# Patient Record
Sex: FEMALE | Race: BLACK OR AFRICAN AMERICAN | NOT HISPANIC OR LATINO | URBAN - METROPOLITAN AREA
[De-identification: names, ages, dates, MRNs, and addresses within clinical notes are randomized per-mention and may not be internally consistent; named-entity substitution may affect disease eponyms.]

---

## 2017-04-26 ENCOUNTER — EMERGENCY (EMERGENCY)
Facility: HOSPITAL | Age: 37
LOS: 1 days | Discharge: HOME | End: 2017-04-26
Attending: EMERGENCY MEDICINE | Admitting: EMERGENCY MEDICINE
Payer: COMMERCIAL

## 2017-04-26 VITALS
TEMPERATURE: 98 F | SYSTOLIC BLOOD PRESSURE: 142 MMHG | DIASTOLIC BLOOD PRESSURE: 95 MMHG | RESPIRATION RATE: 16 BRPM | OXYGEN SATURATION: 100 % | HEART RATE: 74 BPM

## 2017-04-26 PROCEDURE — 99285 EMERGENCY DEPT VISIT HI MDM: CPT

## 2017-04-27 VITALS
DIASTOLIC BLOOD PRESSURE: 72 MMHG | RESPIRATION RATE: 16 BRPM | HEART RATE: 74 BPM | OXYGEN SATURATION: 100 % | SYSTOLIC BLOOD PRESSURE: 107 MMHG

## 2017-04-27 LAB
ALBUMIN SERPL ELPH-MCNC: 4.1 G/DL — SIGNIFICANT CHANGE UP (ref 3.3–5)
ALP SERPL-CCNC: 68 U/L — SIGNIFICANT CHANGE UP (ref 40–120)
ALT FLD-CCNC: 18 U/L — SIGNIFICANT CHANGE UP (ref 4–33)
APPEARANCE UR: CLEAR — SIGNIFICANT CHANGE UP
AST SERPL-CCNC: 22 U/L — SIGNIFICANT CHANGE UP (ref 4–32)
BACTERIA # UR AUTO: SIGNIFICANT CHANGE UP
BASOPHILS # BLD AUTO: 0.03 K/UL — SIGNIFICANT CHANGE UP (ref 0–0.2)
BASOPHILS NFR BLD AUTO: 0.4 % — SIGNIFICANT CHANGE UP (ref 0–2)
BILIRUB SERPL-MCNC: 0.3 MG/DL — SIGNIFICANT CHANGE UP (ref 0.2–1.2)
BILIRUB UR-MCNC: NEGATIVE — SIGNIFICANT CHANGE UP
BLD GP AB SCN SERPL QL: NEGATIVE — SIGNIFICANT CHANGE UP
BLOOD UR QL VISUAL: HIGH
BUN SERPL-MCNC: 9 MG/DL — SIGNIFICANT CHANGE UP (ref 7–23)
CALCIUM SERPL-MCNC: 8.9 MG/DL — SIGNIFICANT CHANGE UP (ref 8.4–10.5)
CHLORIDE SERPL-SCNC: 105 MMOL/L — SIGNIFICANT CHANGE UP (ref 98–107)
CO2 SERPL-SCNC: 20 MMOL/L — LOW (ref 22–31)
COLOR SPEC: YELLOW — SIGNIFICANT CHANGE UP
CREAT SERPL-MCNC: 0.71 MG/DL — SIGNIFICANT CHANGE UP (ref 0.5–1.3)
EOSINOPHIL # BLD AUTO: 0.23 K/UL — SIGNIFICANT CHANGE UP (ref 0–0.5)
EOSINOPHIL NFR BLD AUTO: 2.8 % — SIGNIFICANT CHANGE UP (ref 0–6)
GLUCOSE SERPL-MCNC: 78 MG/DL — SIGNIFICANT CHANGE UP (ref 70–99)
GLUCOSE UR-MCNC: NEGATIVE — SIGNIFICANT CHANGE UP
HCG SERPL-ACNC: 80.45 MIU/ML — SIGNIFICANT CHANGE UP
HCT VFR BLD CALC: 40.3 % — SIGNIFICANT CHANGE UP (ref 34.5–45)
HGB BLD-MCNC: 12.8 G/DL — SIGNIFICANT CHANGE UP (ref 11.5–15.5)
HYALINE CASTS # UR AUTO: SIGNIFICANT CHANGE UP (ref 0–?)
IMM GRANULOCYTES NFR BLD AUTO: 0.4 % — SIGNIFICANT CHANGE UP (ref 0–1.5)
KETONES UR-MCNC: NEGATIVE — SIGNIFICANT CHANGE UP
LEUKOCYTE ESTERASE UR-ACNC: SIGNIFICANT CHANGE UP
LYMPHOCYTES # BLD AUTO: 2.89 K/UL — SIGNIFICANT CHANGE UP (ref 1–3.3)
LYMPHOCYTES # BLD AUTO: 34.9 % — SIGNIFICANT CHANGE UP (ref 13–44)
MCHC RBC-ENTMCNC: 28 PG — SIGNIFICANT CHANGE UP (ref 27–34)
MCHC RBC-ENTMCNC: 31.8 % — LOW (ref 32–36)
MCV RBC AUTO: 88.2 FL — SIGNIFICANT CHANGE UP (ref 80–100)
MONOCYTES # BLD AUTO: 0.62 K/UL — SIGNIFICANT CHANGE UP (ref 0–0.9)
MONOCYTES NFR BLD AUTO: 7.5 % — SIGNIFICANT CHANGE UP (ref 2–14)
MUCOUS THREADS # UR AUTO: SIGNIFICANT CHANGE UP
NEUTROPHILS # BLD AUTO: 4.49 K/UL — SIGNIFICANT CHANGE UP (ref 1.8–7.4)
NEUTROPHILS NFR BLD AUTO: 54 % — SIGNIFICANT CHANGE UP (ref 43–77)
NITRITE UR-MCNC: NEGATIVE — SIGNIFICANT CHANGE UP
PH UR: 6 — SIGNIFICANT CHANGE UP (ref 4.6–8)
PLATELET # BLD AUTO: 294 K/UL — SIGNIFICANT CHANGE UP (ref 150–400)
PMV BLD: 10.7 FL — SIGNIFICANT CHANGE UP (ref 7–13)
POTASSIUM SERPL-MCNC: 3.7 MMOL/L — SIGNIFICANT CHANGE UP (ref 3.5–5.3)
POTASSIUM SERPL-SCNC: 3.7 MMOL/L — SIGNIFICANT CHANGE UP (ref 3.5–5.3)
PROT SERPL-MCNC: 6.9 G/DL — SIGNIFICANT CHANGE UP (ref 6–8.3)
PROT UR-MCNC: 20 — SIGNIFICANT CHANGE UP
RBC # BLD: 4.57 M/UL — SIGNIFICANT CHANGE UP (ref 3.8–5.2)
RBC # FLD: 15.4 % — HIGH (ref 10.3–14.5)
RBC CASTS # UR COMP ASSIST: HIGH (ref 0–?)
RH IG SCN BLD-IMP: POSITIVE — SIGNIFICANT CHANGE UP
SODIUM SERPL-SCNC: 140 MMOL/L — SIGNIFICANT CHANGE UP (ref 135–145)
SP GR SPEC: 1.03 — SIGNIFICANT CHANGE UP (ref 1–1.03)
SQUAMOUS # UR AUTO: SIGNIFICANT CHANGE UP
TSH SERPL-MCNC: 2.05 UIU/ML — SIGNIFICANT CHANGE UP (ref 0.27–4.2)
UROBILINOGEN FLD QL: NORMAL E.U. — SIGNIFICANT CHANGE UP (ref 0.1–0.2)
WBC # BLD: 8.29 K/UL — SIGNIFICANT CHANGE UP (ref 3.8–10.5)
WBC # FLD AUTO: 8.29 K/UL — SIGNIFICANT CHANGE UP (ref 3.8–10.5)
WBC UR QL: SIGNIFICANT CHANGE UP (ref 0–?)

## 2017-04-27 PROCEDURE — 99283 EMERGENCY DEPT VISIT LOW MDM: CPT

## 2017-04-27 PROCEDURE — 76830 TRANSVAGINAL US NON-OB: CPT | Mod: 26

## 2017-04-27 RX ORDER — ONDANSETRON 8 MG/1
4 TABLET, FILM COATED ORAL ONCE
Qty: 0 | Refills: 0 | Status: COMPLETED | OUTPATIENT
Start: 2017-04-27 | End: 2017-04-27

## 2017-04-27 RX ORDER — SODIUM CHLORIDE 9 MG/ML
1000 INJECTION INTRAMUSCULAR; INTRAVENOUS; SUBCUTANEOUS ONCE
Qty: 0 | Refills: 0 | Status: COMPLETED | OUTPATIENT
Start: 2017-04-27 | End: 2017-04-27

## 2017-04-27 RX ADMIN — ONDANSETRON 4 MILLIGRAM(S): 8 TABLET, FILM COATED ORAL at 01:24

## 2017-04-27 RX ADMIN — SODIUM CHLORIDE 1000 MILLILITER(S): 9 INJECTION INTRAMUSCULAR; INTRAVENOUS; SUBCUTANEOUS at 01:24

## 2017-04-27 NOTE — ED PROVIDER NOTE - ATTENDING CONTRIBUTION TO CARE
Pt was seen and evaluated by me. Pt states having LLQ abd pain X 1 day. Pt states her LMP was 5 wks ago and noted having some vaginal bleeding as well as 7 episodes of diarrhea, non-bloody over the past day. Pt denies any fever, chills, SOB, chest pain, or dysuria. Lungs CTA b/l. RRR. Abd soft, non-tender.

## 2017-04-27 NOTE — ED ADULT NURSE NOTE - OBJECTIVE STATEMENT
pt a&o x3 c/o b/l lower quadrant pain since today. pt c/o nausea and diarrhea, no vomitting. reports urinary frequency with little output, denies burning or pain upon urination. vs as documented. labs sent as ordered. nad noted. will monitor

## 2017-04-27 NOTE — ED PROVIDER NOTE - MEDICAL DECISION MAKING DETAILS
35 y/o female with LLQ abd pain X 1 day with vaginal bleeding and diarrhea. Concern for enteritis, UTI, r/o torsion.

## 2017-04-27 NOTE — ED PROVIDER NOTE - OBJECTIVE STATEMENT
36yF  p/w llq pain and vag bleeding since this am, lmp 5-6wks ago (pt typically has irregular periods), has had diarrhea and nausea since last pm. No bloody or black stools. No vomiting. Pain is sharp, nonradiating. No dysuria, recent travel, fever, or known sick contacts. Sexually active with 1 partner, does not use contraception. No STI hx. 36yF  p/w llq pain and vag bleeding since this am, lmp 5-6wks ago (pt typically has irregular periods), has had diarrhea and nausea since last pm. No bloody or black stools. No vomiting. Pain is sharp, non-radiating. No dysuria, recent travel, fever, or known sick contacts. Sexually active with 1 partner, does not use contraception. No STI hx.

## 2017-04-27 NOTE — ED PROVIDER NOTE - CARE PLAN
Principal Discharge DX:	Vaginal bleeding in pregnancy, first trimester  Instructions for follow-up, activity and diet:	The patient was given verbal and written discharge instructions. Specifically, instructions when to return to the ED and when to seek follow-up from their pcp was discussed. Any specialty follow-up was discussed, including how to make an appointment.  Instructions were discussed in simple, plain language and was understood by the patient. The patient understands that should their symptoms worsen or any new symptoms arise, they should return to the ED immediately for further evaluation.

## 2017-04-29 ENCOUNTER — EMERGENCY (EMERGENCY)
Facility: HOSPITAL | Age: 37
LOS: 1 days | Discharge: HOME | End: 2017-04-29
Attending: EMERGENCY MEDICINE | Admitting: EMERGENCY MEDICINE
Payer: COMMERCIAL

## 2017-04-29 VITALS
TEMPERATURE: 98 F | HEIGHT: 62 IN | HEART RATE: 85 BPM | WEIGHT: 149.91 LBS | RESPIRATION RATE: 16 BRPM | OXYGEN SATURATION: 100 % | DIASTOLIC BLOOD PRESSURE: 99 MMHG | SYSTOLIC BLOOD PRESSURE: 143 MMHG

## 2017-04-29 VITALS
TEMPERATURE: 98 F | OXYGEN SATURATION: 99 % | SYSTOLIC BLOOD PRESSURE: 140 MMHG | RESPIRATION RATE: 18 BRPM | HEART RATE: 88 BPM | DIASTOLIC BLOOD PRESSURE: 89 MMHG

## 2017-04-29 LAB
ALBUMIN SERPL ELPH-MCNC: 4.6 G/DL — SIGNIFICANT CHANGE UP (ref 3.3–5)
ALP SERPL-CCNC: 82 U/L — SIGNIFICANT CHANGE UP (ref 40–120)
ALT FLD-CCNC: 15 U/L — SIGNIFICANT CHANGE UP (ref 4–33)
AST SERPL-CCNC: 19 U/L — SIGNIFICANT CHANGE UP (ref 4–32)
BASOPHILS # BLD AUTO: 0.03 K/UL — SIGNIFICANT CHANGE UP (ref 0–0.2)
BASOPHILS NFR BLD AUTO: 0.3 % — SIGNIFICANT CHANGE UP (ref 0–2)
BILIRUB SERPL-MCNC: 0.2 MG/DL — SIGNIFICANT CHANGE UP (ref 0.2–1.2)
BUN SERPL-MCNC: 13 MG/DL — SIGNIFICANT CHANGE UP (ref 7–23)
CALCIUM SERPL-MCNC: 9.2 MG/DL — SIGNIFICANT CHANGE UP (ref 8.4–10.5)
CHLORIDE SERPL-SCNC: 102 MMOL/L — SIGNIFICANT CHANGE UP (ref 98–107)
CO2 SERPL-SCNC: 23 MMOL/L — SIGNIFICANT CHANGE UP (ref 22–31)
CREAT SERPL-MCNC: 0.76 MG/DL — SIGNIFICANT CHANGE UP (ref 0.5–1.3)
EOSINOPHIL # BLD AUTO: 0.31 K/UL — SIGNIFICANT CHANGE UP (ref 0–0.5)
EOSINOPHIL NFR BLD AUTO: 2.8 % — SIGNIFICANT CHANGE UP (ref 0–6)
GLUCOSE SERPL-MCNC: 93 MG/DL — SIGNIFICANT CHANGE UP (ref 70–99)
HCT VFR BLD CALC: 42.6 % — SIGNIFICANT CHANGE UP (ref 34.5–45)
HGB BLD-MCNC: 13.8 G/DL — SIGNIFICANT CHANGE UP (ref 11.5–15.5)
IMM GRANULOCYTES NFR BLD AUTO: 0.2 % — SIGNIFICANT CHANGE UP (ref 0–1.5)
LYMPHOCYTES # BLD AUTO: 3.32 K/UL — HIGH (ref 1–3.3)
LYMPHOCYTES # BLD AUTO: 30.4 % — SIGNIFICANT CHANGE UP (ref 13–44)
MCHC RBC-ENTMCNC: 28.9 PG — SIGNIFICANT CHANGE UP (ref 27–34)
MCHC RBC-ENTMCNC: 32.4 % — SIGNIFICANT CHANGE UP (ref 32–36)
MCV RBC AUTO: 89.1 FL — SIGNIFICANT CHANGE UP (ref 80–100)
MONOCYTES # BLD AUTO: 0.83 K/UL — SIGNIFICANT CHANGE UP (ref 0–0.9)
MONOCYTES NFR BLD AUTO: 7.6 % — SIGNIFICANT CHANGE UP (ref 2–14)
NEUTROPHILS # BLD AUTO: 6.4 K/UL — SIGNIFICANT CHANGE UP (ref 1.8–7.4)
NEUTROPHILS NFR BLD AUTO: 58.7 % — SIGNIFICANT CHANGE UP (ref 43–77)
PLATELET # BLD AUTO: 287 K/UL — SIGNIFICANT CHANGE UP (ref 150–400)
PMV BLD: 10.5 FL — SIGNIFICANT CHANGE UP (ref 7–13)
POTASSIUM SERPL-MCNC: 3.8 MMOL/L — SIGNIFICANT CHANGE UP (ref 3.5–5.3)
POTASSIUM SERPL-SCNC: 3.8 MMOL/L — SIGNIFICANT CHANGE UP (ref 3.5–5.3)
PROT SERPL-MCNC: 7.8 G/DL — SIGNIFICANT CHANGE UP (ref 6–8.3)
RBC # BLD: 4.78 M/UL — SIGNIFICANT CHANGE UP (ref 3.8–5.2)
RBC # FLD: 15.2 % — HIGH (ref 10.3–14.5)
SODIUM SERPL-SCNC: 140 MMOL/L — SIGNIFICANT CHANGE UP (ref 135–145)
WBC # BLD: 10.91 K/UL — HIGH (ref 3.8–10.5)
WBC # FLD AUTO: 10.91 K/UL — HIGH (ref 3.8–10.5)

## 2017-04-29 PROCEDURE — 99284 EMERGENCY DEPT VISIT MOD MDM: CPT

## 2017-04-29 NOTE — ED ADULT NURSE NOTE - OBJECTIVE STATEMENT
Patient arrives A&Ox3 ambulatory to room 20 c/o lower abdominal cramping and vaginal bleeding, (about 2 pads today). Patient was seen here on Weds, discovered she was pregnant, told to follow up here for repeat HCG level, lat period was March 12. Patient denies any shortness of breath, chest pain, dizziness, n/v/d. NAD noted. SPo2 100% On room air, appears comfortable. 20G IV access obtained labs drawn and sent. MD at bedside evaluating. Will CTM closely.

## 2017-04-29 NOTE — ED PROVIDER NOTE - MEDICAL DECISION MAKING DETAILS
36 F , with vaginal bleeding x 3 days, LMP 3/12, now worsening bleeding with clots and cramping, no IUP 2 days ago. Will recheck beta and cbc,if downtrending likely miscarriage, if increasing beta will tvus and ob consult

## 2017-04-29 NOTE — ED PROVIDER NOTE - PROGRESS NOTE DETAILS
beta hcg dropping appropriately, VSS, discussed with patient likely having or had a miscarriage, stable for d/c with obgyn f/u outpatient

## 2017-04-29 NOTE — ED PROVIDER NOTE - OBJECTIVE STATEMENT
Pt here 2 days ago with vaginal bleeding late menses (period due 1st week of April)   beta weakly positive  no IUP seen  told to return to ED in 2 days  Pt notes continued vaginal bleeding (3pads/day)    no fever vomiting  difficulty breathing

## 2017-04-29 NOTE — ED ADULT TRIAGE NOTE - CHIEF COMPLAINT QUOTE
Pt arrives to ED as a call back to check hcg level.  Pt was here for cramping Wednesday night and discovered she was pregnant.  Pt was told she may be having a miscarriage.

## 2017-04-30 LAB
BLD GP AB SCN SERPL QL: NEGATIVE — SIGNIFICANT CHANGE UP
RH IG SCN BLD-IMP: POSITIVE — SIGNIFICANT CHANGE UP

## 2017-07-24 ENCOUNTER — RESULT REVIEW (OUTPATIENT)
Age: 37
End: 2017-07-24

## 2017-11-16 ENCOUNTER — EMERGENCY (EMERGENCY)
Facility: HOSPITAL | Age: 37
LOS: 1 days | Discharge: STILL ADMITTED OR ADMINISTRATIVE | End: 2017-11-16
Admitting: EMERGENCY MEDICINE

## 2017-11-16 ENCOUNTER — OUTPATIENT (OUTPATIENT)
Dept: OUTPATIENT SERVICES | Facility: HOSPITAL | Age: 37
LOS: 1 days | End: 2017-11-16

## 2017-11-16 VITALS
RESPIRATION RATE: 16 BRPM | SYSTOLIC BLOOD PRESSURE: 141 MMHG | HEART RATE: 110 BPM | DIASTOLIC BLOOD PRESSURE: 95 MMHG | TEMPERATURE: 98 F | OXYGEN SATURATION: 100 %

## 2017-11-16 DIAGNOSIS — O26.899 OTHER SPECIFIED PREGNANCY RELATED CONDITIONS, UNSPECIFIED TRIMESTER: ICD-10-CM

## 2017-11-16 DIAGNOSIS — Z3A.00 WEEKS OF GESTATION OF PREGNANCY NOT SPECIFIED: ICD-10-CM

## 2017-11-16 LAB
APPEARANCE UR: SIGNIFICANT CHANGE UP
BACTERIA # UR AUTO: SIGNIFICANT CHANGE UP
BILIRUB UR-MCNC: NEGATIVE — SIGNIFICANT CHANGE UP
BLOOD UR QL VISUAL: NEGATIVE — SIGNIFICANT CHANGE UP
COLOR SPEC: SIGNIFICANT CHANGE UP
GLUCOSE UR-MCNC: NEGATIVE — SIGNIFICANT CHANGE UP
KETONES UR-MCNC: NEGATIVE — SIGNIFICANT CHANGE UP
LEUKOCYTE ESTERASE UR-ACNC: NEGATIVE — SIGNIFICANT CHANGE UP
MUCOUS THREADS # UR AUTO: SIGNIFICANT CHANGE UP
NITRITE UR-MCNC: NEGATIVE — SIGNIFICANT CHANGE UP
PH UR: 6.5 — SIGNIFICANT CHANGE UP (ref 4.6–8)
PROT UR-MCNC: 10 — SIGNIFICANT CHANGE UP
RBC CASTS # UR COMP ASSIST: SIGNIFICANT CHANGE UP (ref 0–?)
SP GR SPEC: 1.01 — SIGNIFICANT CHANGE UP (ref 1–1.03)
SQUAMOUS # UR AUTO: SIGNIFICANT CHANGE UP
UROBILINOGEN FLD QL: NORMAL E.U. — SIGNIFICANT CHANGE UP (ref 0.1–0.2)
WBC UR QL: SIGNIFICANT CHANGE UP (ref 0–?)

## 2017-11-16 RX ORDER — INDOMETHACIN 50 MG
50 CAPSULE ORAL ONCE
Qty: 0 | Refills: 0 | Status: DISCONTINUED | OUTPATIENT
Start: 2017-11-16 | End: 2017-12-01

## 2017-11-16 RX ORDER — OXYCODONE AND ACETAMINOPHEN 5; 325 MG/1; MG/1
1 TABLET ORAL ONCE
Qty: 0 | Refills: 0 | Status: DISCONTINUED | OUTPATIENT
Start: 2017-11-16 | End: 2017-12-01

## 2017-11-16 NOTE — ED ADULT TRIAGE NOTE - CHIEF COMPLAINT QUOTE
25 weeks pregnant with c/o lower abdominal cramping since yesterday morning, denies any vaginal bleeding. (Dr. Reyez) BALDEMAR 03/01/18

## 2017-12-12 ENCOUNTER — ASOB RESULT (OUTPATIENT)
Age: 37
End: 2017-12-12

## 2017-12-12 ENCOUNTER — APPOINTMENT (OUTPATIENT)
Dept: ANTEPARTUM | Facility: CLINIC | Age: 37
End: 2017-12-12
Payer: MEDICAID

## 2017-12-12 PROCEDURE — 76811 OB US DETAILED SNGL FETUS: CPT

## 2017-12-12 PROCEDURE — 99201 OFFICE OUTPATIENT NEW 10 MINUTES: CPT | Mod: 25

## 2017-12-19 ENCOUNTER — OUTPATIENT (OUTPATIENT)
Dept: OUTPATIENT SERVICES | Age: 37
LOS: 1 days | Discharge: HOME | End: 2017-12-19

## 2017-12-21 ENCOUNTER — APPOINTMENT (OUTPATIENT)
Dept: PEDIATRIC CARDIOLOGY | Facility: CLINIC | Age: 37
End: 2017-12-21
Payer: MEDICAID

## 2017-12-21 PROCEDURE — 76825 ECHO EXAM OF FETAL HEART: CPT

## 2017-12-21 PROCEDURE — 76827 ECHO EXAM OF FETAL HEART: CPT

## 2017-12-21 PROCEDURE — 99201 OFFICE OUTPATIENT NEW 10 MINUTES: CPT | Mod: 25

## 2017-12-21 PROCEDURE — 76820 UMBILICAL ARTERY ECHO: CPT

## 2017-12-21 PROCEDURE — 93325 DOPPLER ECHO COLOR FLOW MAPG: CPT | Mod: 59

## 2018-01-08 ENCOUNTER — EMERGENCY (EMERGENCY)
Facility: HOSPITAL | Age: 38
LOS: 1 days | Discharge: HOME | End: 2018-01-08
Attending: EMERGENCY MEDICINE | Admitting: EMERGENCY MEDICINE
Payer: COMMERCIAL

## 2018-01-08 ENCOUNTER — APPOINTMENT (OUTPATIENT)
Dept: ANTEPARTUM | Facility: CLINIC | Age: 38
End: 2018-01-08

## 2018-01-08 VITALS
OXYGEN SATURATION: 100 % | RESPIRATION RATE: 16 BRPM | SYSTOLIC BLOOD PRESSURE: 121 MMHG | TEMPERATURE: 98 F | HEART RATE: 86 BPM | DIASTOLIC BLOOD PRESSURE: 72 MMHG

## 2018-01-08 VITALS
RESPIRATION RATE: 18 BRPM | OXYGEN SATURATION: 99 % | HEART RATE: 119 BPM | DIASTOLIC BLOOD PRESSURE: 79 MMHG | TEMPERATURE: 98 F | SYSTOLIC BLOOD PRESSURE: 115 MMHG

## 2018-01-08 DIAGNOSIS — R06.02 SHORTNESS OF BREATH: ICD-10-CM

## 2018-01-08 LAB
ALBUMIN SERPL ELPH-MCNC: 3.4 G/DL — SIGNIFICANT CHANGE UP (ref 3.3–5)
ALP SERPL-CCNC: 99 U/L — SIGNIFICANT CHANGE UP (ref 40–120)
ALT FLD-CCNC: 10 U/L — SIGNIFICANT CHANGE UP (ref 4–33)
APPEARANCE UR: CLEAR — SIGNIFICANT CHANGE UP
APTT BLD: 26.6 SEC — LOW (ref 27.5–37.4)
AST SERPL-CCNC: 19 U/L — SIGNIFICANT CHANGE UP (ref 4–32)
BACTERIA # UR AUTO: SIGNIFICANT CHANGE UP
BASE EXCESS BLDV CALC-SCNC: -1 MMOL/L — SIGNIFICANT CHANGE UP
BASOPHILS # BLD AUTO: 0.03 K/UL — SIGNIFICANT CHANGE UP (ref 0–0.2)
BASOPHILS NFR BLD AUTO: 0.3 % — SIGNIFICANT CHANGE UP (ref 0–2)
BILIRUB SERPL-MCNC: 0.2 MG/DL — SIGNIFICANT CHANGE UP (ref 0.2–1.2)
BILIRUB UR-MCNC: NEGATIVE — SIGNIFICANT CHANGE UP
BLOOD GAS VENOUS - CREATININE: 0.56 MG/DL — SIGNIFICANT CHANGE UP (ref 0.5–1.3)
BLOOD UR QL VISUAL: NEGATIVE — SIGNIFICANT CHANGE UP
BUN SERPL-MCNC: 8 MG/DL — SIGNIFICANT CHANGE UP (ref 7–23)
CALCIUM SERPL-MCNC: 8.6 MG/DL — SIGNIFICANT CHANGE UP (ref 8.4–10.5)
CHLORIDE BLDV-SCNC: 107 MMOL/L — SIGNIFICANT CHANGE UP (ref 96–108)
CHLORIDE SERPL-SCNC: 103 MMOL/L — SIGNIFICANT CHANGE UP (ref 98–107)
CO2 SERPL-SCNC: 21 MMOL/L — LOW (ref 22–31)
COLOR SPEC: YELLOW — SIGNIFICANT CHANGE UP
CREAT SERPL-MCNC: 0.62 MG/DL — SIGNIFICANT CHANGE UP (ref 0.5–1.3)
D DIMER BLD IA.RAPID-MCNC: 2578 NG/ML — SIGNIFICANT CHANGE UP
EOSINOPHIL # BLD AUTO: 0.13 K/UL — SIGNIFICANT CHANGE UP (ref 0–0.5)
EOSINOPHIL NFR BLD AUTO: 1.3 % — SIGNIFICANT CHANGE UP (ref 0–6)
GAS PNL BLDV: 134 MMOL/L — LOW (ref 136–146)
GLUCOSE BLDV-MCNC: 100 — HIGH (ref 70–99)
GLUCOSE SERPL-MCNC: 95 MG/DL — SIGNIFICANT CHANGE UP (ref 70–99)
GLUCOSE UR-MCNC: NEGATIVE — SIGNIFICANT CHANGE UP
HCO3 BLDV-SCNC: 23 MMOL/L — SIGNIFICANT CHANGE UP (ref 20–27)
HCT VFR BLD CALC: 34.7 % — SIGNIFICANT CHANGE UP (ref 34.5–45)
HCT VFR BLDV CALC: 36.2 % — SIGNIFICANT CHANGE UP (ref 34.5–45)
HGB BLD-MCNC: 11.4 G/DL — LOW (ref 11.5–15.5)
HGB BLDV-MCNC: 11.7 G/DL — SIGNIFICANT CHANGE UP (ref 11.5–15.5)
IMM GRANULOCYTES # BLD AUTO: 0.09 # — SIGNIFICANT CHANGE UP
IMM GRANULOCYTES NFR BLD AUTO: 0.9 % — SIGNIFICANT CHANGE UP (ref 0–1.5)
INR BLD: 0.91 — SIGNIFICANT CHANGE UP (ref 0.88–1.17)
KETONES UR-MCNC: HIGH
LACTATE BLDV-MCNC: 1.7 MMOL/L — SIGNIFICANT CHANGE UP (ref 0.5–2)
LEUKOCYTE ESTERASE UR-ACNC: NEGATIVE — SIGNIFICANT CHANGE UP
LYMPHOCYTES # BLD AUTO: 2.33 K/UL — SIGNIFICANT CHANGE UP (ref 1–3.3)
LYMPHOCYTES # BLD AUTO: 23.7 % — SIGNIFICANT CHANGE UP (ref 13–44)
MCHC RBC-ENTMCNC: 29.5 PG — SIGNIFICANT CHANGE UP (ref 27–34)
MCHC RBC-ENTMCNC: 32.9 % — SIGNIFICANT CHANGE UP (ref 32–36)
MCV RBC AUTO: 89.9 FL — SIGNIFICANT CHANGE UP (ref 80–100)
MONOCYTES # BLD AUTO: 0.58 K/UL — SIGNIFICANT CHANGE UP (ref 0–0.9)
MONOCYTES NFR BLD AUTO: 5.9 % — SIGNIFICANT CHANGE UP (ref 2–14)
MUCOUS THREADS # UR AUTO: SIGNIFICANT CHANGE UP
NEUTROPHILS # BLD AUTO: 6.66 K/UL — SIGNIFICANT CHANGE UP (ref 1.8–7.4)
NEUTROPHILS NFR BLD AUTO: 67.9 % — SIGNIFICANT CHANGE UP (ref 43–77)
NITRITE UR-MCNC: NEGATIVE — SIGNIFICANT CHANGE UP
NRBC # FLD: 0 — SIGNIFICANT CHANGE UP
PCO2 BLDV: 39 MMHG — LOW (ref 41–51)
PH BLDV: 7.4 PH — SIGNIFICANT CHANGE UP (ref 7.32–7.43)
PH UR: 6 — SIGNIFICANT CHANGE UP (ref 5–8)
PLATELET # BLD AUTO: 257 K/UL — SIGNIFICANT CHANGE UP (ref 150–400)
PMV BLD: 10.5 FL — SIGNIFICANT CHANGE UP (ref 7–13)
PO2 BLDV: 30 MMHG — LOW (ref 35–40)
POTASSIUM BLDV-SCNC: 3.3 MMOL/L — LOW (ref 3.4–4.5)
POTASSIUM SERPL-MCNC: 4.1 MMOL/L — SIGNIFICANT CHANGE UP (ref 3.5–5.3)
POTASSIUM SERPL-SCNC: 4.1 MMOL/L — SIGNIFICANT CHANGE UP (ref 3.5–5.3)
PROT SERPL-MCNC: 6.8 G/DL — SIGNIFICANT CHANGE UP (ref 6–8.3)
PROT UR-MCNC: 30 MG/DL — HIGH
PROTHROM AB SERPL-ACNC: 10.5 SEC — SIGNIFICANT CHANGE UP (ref 9.8–13.1)
RBC # BLD: 3.86 M/UL — SIGNIFICANT CHANGE UP (ref 3.8–5.2)
RBC # FLD: 15.3 % — HIGH (ref 10.3–14.5)
RBC CASTS # UR COMP ASSIST: SIGNIFICANT CHANGE UP (ref 0–?)
SAO2 % BLDV: 48.9 % — LOW (ref 60–85)
SODIUM SERPL-SCNC: 139 MMOL/L — SIGNIFICANT CHANGE UP (ref 135–145)
SP GR SPEC: 1.03 — SIGNIFICANT CHANGE UP (ref 1–1.04)
SQUAMOUS # UR AUTO: SIGNIFICANT CHANGE UP
TROPONIN T SERPL-MCNC: < 0.06 NG/ML — SIGNIFICANT CHANGE UP (ref 0–0.06)
TSH SERPL-MCNC: 2.82 UIU/ML — SIGNIFICANT CHANGE UP (ref 0.27–4.2)
UROBILINOGEN FLD QL: 2 MG/DL — HIGH
WBC # BLD: 9.82 K/UL — SIGNIFICANT CHANGE UP (ref 3.8–10.5)
WBC # FLD AUTO: 9.82 K/UL — SIGNIFICANT CHANGE UP (ref 3.8–10.5)
WBC UR QL: SIGNIFICANT CHANGE UP (ref 0–?)

## 2018-01-08 PROCEDURE — 93010 ELECTROCARDIOGRAM REPORT: CPT | Mod: 59

## 2018-01-08 PROCEDURE — 71275 CT ANGIOGRAPHY CHEST: CPT | Mod: 26

## 2018-01-08 PROCEDURE — 99285 EMERGENCY DEPT VISIT HI MDM: CPT | Mod: 25

## 2018-01-08 RX ORDER — SODIUM CHLORIDE 9 MG/ML
1000 INJECTION INTRAMUSCULAR; INTRAVENOUS; SUBCUTANEOUS ONCE
Qty: 0 | Refills: 0 | Status: COMPLETED | OUTPATIENT
Start: 2018-01-08 | End: 2018-01-08

## 2018-01-08 RX ADMIN — SODIUM CHLORIDE 1000 MILLILITER(S): 9 INJECTION INTRAMUSCULAR; INTRAVENOUS; SUBCUTANEOUS at 20:30

## 2018-01-08 RX ADMIN — SODIUM CHLORIDE 1000 MILLILITER(S): 9 INJECTION INTRAMUSCULAR; INTRAVENOUS; SUBCUTANEOUS at 14:31

## 2018-01-08 NOTE — ED PROVIDER NOTE - PROGRESS NOTE DETAILS
Chaparrita: discussed case with OBGYN. They agree that despite radiation risk to patient and fetus that given high risk of PE in this patient, the best management would be to get CTA chest rather than V/Q scan to rule out PE as it is the most definitive test. Ct shows no PE.  Plan was for non-stress test and likely d/c home.  However during the NST patient became tachypneic, and tachypneic, and c/o pain to chest and "throat tightening".  My resident has notified GYN to re-eval patient. Patient feels "better" without intervention.  reports having these sx daily for several weeks, including in her OB's office.  I contacted the coverage for Dr Wilkins (Dr Coronel) and discussed our findings.  We agree that patient can be safe for d/c home with close follow-up.  I offered patient a short course of prn xanax, but she prefers not to have medicine to treat her sx.  I told her if she feels unsafe we can admit  She feels safe going home.  will d/c

## 2018-01-08 NOTE — ED PROVIDER NOTE - MEDICAL DECISION MAKING DETAILS
Samira: 31 weeks preg, SOB, diaphoresis intermittent x 2 wks. No infectious Sx. Appears well. NAD. Afebrile. Vital signs unremarkable. No LE edema.  upon arrival, then 110 w/o intervention. D-dimer 2,500. Consideration for PE. Check CT scan (discussed risks/benefits with OB and with patient).

## 2018-01-08 NOTE — CONSULT NOTE ADULT - PROBLEM SELECTOR RECOMMENDATION 9
-CTPA to rule out PE  -NST   d[facility] [id number]

## 2018-01-08 NOTE — CONSULT NOTE ADULT - ASSESSMENT
37  @ 31w4d presents with SOB worsened over the last 2 weeks, likely secondary to pregnancy-related changes however would rule out PE with CTPA

## 2018-01-08 NOTE — CONSULT NOTE ADULT - SUBJECTIVE AND OBJECTIVE BOX
OBGYN Consult   37y  @ 31w4d presents with progressive shortness of breath throughout her pregnancy, worse in the last 2 weeks. She feels short of breath with exertion but does not have SOB at rest. Reports occasional CP but no CP currently. She did not have this SOB with her previous 2 pregnancies. Denies associated cough. Denies fevers/chills. Denies sick contacts. Denies NV. She is not experiencing contractions, vaginal bleeding, or leakage of fluids. Reports good FM. PNC uncomplicated per patient, however she was supposed to go for MFM scan in ATU today to rule out umbilical vein thrombosis due to umbilical vein enlargement noted at last scan.   BALDEMAR: 3/1/18      OB/GYN HISTORY: FT  x2 (uncomplicated, however >10 years prior), SABx1, fibroids, denies history of endometriosis, abn pap, STDs, etc   PAST MEDICAL & SURGICAL HISTORY:  No pertinent past medical history  No significant past surgical history    Allergies    penicillin (Rash)    Home meds: none    FAMILY HISTORY:  No pertinent family history in first degree relatives    SOCIAL HISTORY: denies TAD or secondhand smoke     Name of GYN Physician: Claudette       Vital Signs Last 24 Hrs  T(C): 36.7 (2018 13:28), Max: 36.7 (2018 13:28)  T(F): 98 (2018 13:28), Max: 98 (2018 13:28)  HR: 85 (2018 15:00) (85 - 119)  BP: 105/75 (2018 15:00) (105/75 - 115/79)  RR: 16 (2018 15:00) (16 - 20)  SpO2: 100% (2018 15:00) (99% - 100%)    PHYSICAL EXAM:      Constitutional: alert and oriented x 3    Respiratory: clear    Cardiovascular: regular rate and rhythm    Gastrointestinal: soft, non tender, gravid    Genitourinary: deferred     Ext: NTBL:        LABS:                        11.4   9.82  )-----------( 257      ( 2018 13:35 )             34.7         139  |  103  |  8   ----------------------------<  95  4.1   |  21<L>  |  0.62    Ca    8.6      2018 13:35    TPro  6.8  /  Alb  3.4  /  TBili  0.2  /  DBili  x   /  AST  19  /  ALT  10  /  AlkPhos  99  -08    PT/INR - ( 2018 13:35 )   PT: 10.5 SEC;   INR: 0.91          PTT - ( 2018 13:35 )  PTT:26.6 SEC  Urinalysis Basic - ( 2018 14:29 )    Color: YELLOW / Appearance: CLEAR / S.030 / pH: 6.0  Gluc: NEGATIVE / Ketone: SMALL  / Bili: NEGATIVE / Urobili: 2 mg/dL   Blood: NEGATIVE / Protein: 30 mg/dL / Nitrite: NEGATIVE   Leuk Esterase: NEGATIVE / RBC: 0-2 / WBC 2-5   Sq Epi: FEW / Non Sq Epi: x / Bacteria: FEW

## 2018-01-08 NOTE — ED PROVIDER NOTE - CARE PLAN
Principal Discharge DX:	Shortness of breath  Secondary Diagnosis:	Tachycardia  Secondary Diagnosis:	Pregnant and not yet delivered in third trimester

## 2018-01-08 NOTE — ED PROVIDER NOTE - ATTENDING CONTRIBUTION TO CARE
I performed a face-to-face evaluation of the patient and performed a history and physical examination. I agree with the history and physical examination.    Samira: 31 weeks preg, SOB, diaphoresis intermittent x 2 wks. No infectious Sx. Appears well. NAD. Afebrile. Vital signs unremarkable. No LE edema.  upon arrival, then 110 w/o intervention. D-dimer 2,500. Consideration for PE. Check CT scan (discussed risks/benefits with OB and with patient).

## 2018-01-08 NOTE — ED ADULT TRIAGE NOTE - CHIEF COMPLAINT QUOTE
pt 32 w pregnant c/o dizziness, sob and intermittent blurry vision. EKG noted to be abnormal in triage.

## 2018-01-08 NOTE — ED ADULT NURSE NOTE - OBJECTIVE STATEMENT
37 year old female presents to the ER with c/o SOB x several months, worse with walking  Pt speaking full sentences, cardiac monitor shows ST rate 125   Pt is 32 weeks pregnant denies fever, cough or any other concerns 37 year old female presents to the ER with c/o SOB x several months, worse with walking  Pt speaking full sentences, cardiac monitor shows ST rate 125   Pt is 32 weeks pregnant + fetal movement as per patient    denies fever, cough or any other concerns recently flew from [place]

## 2018-01-08 NOTE — ED PROVIDER NOTE - OBJECTIVE STATEMENT
37yF  (32wks gestation) p/w episodes of sob, diaphoresis, blurred vision, weakness that last several minutes x 2wks. Episodes not brought on by exertion, lying flat, eating, or any exposures. Pt has never had similar episodes in past. Yesterday and today had an episode that involved sensation of throat tightening, which concerned pt more and prompted her to visit ED. Had appt at 3pm to evaluate enlarged umbilical vein to r/o umbilical vein thrombosis. Pt asymptomatic now. Last episode occurred while getting EKG. Ended 20min ago. Pt had road trip to [place] 2wks ago. States she got up to walk q2-3hrs but was lying down in the back of the car for the majority of the trip.

## 2018-01-23 ENCOUNTER — ASOB RESULT (OUTPATIENT)
Age: 38
End: 2018-01-23

## 2018-01-23 ENCOUNTER — APPOINTMENT (OUTPATIENT)
Dept: ANTEPARTUM | Facility: CLINIC | Age: 38
End: 2018-01-23
Payer: COMMERCIAL

## 2018-01-23 ENCOUNTER — OUTPATIENT (OUTPATIENT)
Dept: OUTPATIENT SERVICES | Facility: HOSPITAL | Age: 38
LOS: 1 days | End: 2018-01-23

## 2018-01-23 PROCEDURE — 76818 FETAL BIOPHYS PROFILE W/NST: CPT | Mod: 26

## 2018-01-23 PROCEDURE — 76820 UMBILICAL ARTERY ECHO: CPT

## 2018-01-23 PROCEDURE — 76816 OB US FOLLOW-UP PER FETUS: CPT

## 2018-01-24 DIAGNOSIS — Z3A.34 34 WEEKS GESTATION OF PREGNANCY: ICD-10-CM

## 2018-01-24 DIAGNOSIS — O09.33 SUPERVISION OF PREGNANCY WITH INSUFFICIENT ANTENATAL CARE, THIRD TRIMESTER: ICD-10-CM

## 2018-01-24 DIAGNOSIS — O28.3 ABNORMAL ULTRASONIC FINDING ON ANTENATAL SCREENING OF MOTHER: ICD-10-CM

## 2018-01-24 DIAGNOSIS — O34.13 MATERNAL CARE FOR BENIGN TUMOR OF CORPUS UTERI, THIRD TRIMESTER: ICD-10-CM

## 2018-01-24 DIAGNOSIS — O09.523 SUPERVISION OF ELDERLY MULTIGRAVIDA, THIRD TRIMESTER: ICD-10-CM

## 2018-01-24 DIAGNOSIS — O36.5910 MATERNAL CARE FOR OTHER KNOWN OR SUSPECTED POOR FETAL GROWTH, FIRST TRIMESTER, NOT APPLICABLE OR UNSPECIFIED: ICD-10-CM

## 2018-01-30 ENCOUNTER — ASOB RESULT (OUTPATIENT)
Age: 38
End: 2018-01-30

## 2018-01-30 ENCOUNTER — OUTPATIENT (OUTPATIENT)
Dept: OUTPATIENT SERVICES | Facility: HOSPITAL | Age: 38
LOS: 1 days | End: 2018-01-30

## 2018-01-30 ENCOUNTER — APPOINTMENT (OUTPATIENT)
Dept: ANTEPARTUM | Facility: HOSPITAL | Age: 38
End: 2018-01-30

## 2018-01-30 ENCOUNTER — APPOINTMENT (OUTPATIENT)
Dept: ANTEPARTUM | Facility: CLINIC | Age: 38
End: 2018-01-30
Payer: COMMERCIAL

## 2018-01-30 PROCEDURE — 76820 UMBILICAL ARTERY ECHO: CPT

## 2018-01-30 PROCEDURE — 76821 MIDDLE CEREBRAL ARTERY ECHO: CPT

## 2018-01-30 PROCEDURE — 76818 FETAL BIOPHYS PROFILE W/NST: CPT | Mod: 26

## 2018-02-05 DIAGNOSIS — O34.13 MATERNAL CARE FOR BENIGN TUMOR OF CORPUS UTERI, THIRD TRIMESTER: ICD-10-CM

## 2018-02-05 DIAGNOSIS — O36.5930 MATERNAL CARE FOR OTHER KNOWN OR SUSPECTED POOR FETAL GROWTH, THIRD TRIMESTER, NOT APPLICABLE OR UNSPECIFIED: ICD-10-CM

## 2018-02-05 DIAGNOSIS — O28.3 ABNORMAL ULTRASONIC FINDING ON ANTENATAL SCREENING OF MOTHER: ICD-10-CM

## 2018-02-05 DIAGNOSIS — O09.523 SUPERVISION OF ELDERLY MULTIGRAVIDA, THIRD TRIMESTER: ICD-10-CM

## 2018-02-05 DIAGNOSIS — O09.33 SUPERVISION OF PREGNANCY WITH INSUFFICIENT ANTENATAL CARE, THIRD TRIMESTER: ICD-10-CM

## 2018-02-05 DIAGNOSIS — Z3A.35 35 WEEKS GESTATION OF PREGNANCY: ICD-10-CM

## 2018-02-07 ENCOUNTER — APPOINTMENT (OUTPATIENT)
Dept: ANTEPARTUM | Facility: CLINIC | Age: 38
End: 2018-02-07
Payer: COMMERCIAL

## 2018-02-07 ENCOUNTER — APPOINTMENT (OUTPATIENT)
Dept: ANTEPARTUM | Facility: HOSPITAL | Age: 38
End: 2018-02-07

## 2018-02-07 ENCOUNTER — ASOB RESULT (OUTPATIENT)
Age: 38
End: 2018-02-07

## 2018-02-07 ENCOUNTER — OUTPATIENT (OUTPATIENT)
Dept: OUTPATIENT SERVICES | Facility: HOSPITAL | Age: 38
LOS: 1 days | End: 2018-02-07

## 2018-02-07 PROCEDURE — 76820 UMBILICAL ARTERY ECHO: CPT

## 2018-02-07 PROCEDURE — 76818 FETAL BIOPHYS PROFILE W/NST: CPT | Mod: 26

## 2018-02-07 PROCEDURE — 76816 OB US FOLLOW-UP PER FETUS: CPT

## 2018-02-12 ENCOUNTER — OUTPATIENT (OUTPATIENT)
Dept: OUTPATIENT SERVICES | Facility: HOSPITAL | Age: 38
LOS: 1 days | End: 2018-02-12

## 2018-02-12 DIAGNOSIS — O28.3 ABNORMAL ULTRASONIC FINDING ON ANTENATAL SCREENING OF MOTHER: ICD-10-CM

## 2018-02-12 LAB
APPEARANCE UR: SIGNIFICANT CHANGE UP
BACTERIA # UR AUTO: SIGNIFICANT CHANGE UP
BILIRUB UR-MCNC: NEGATIVE — SIGNIFICANT CHANGE UP
BLD GP AB SCN SERPL QL: NEGATIVE — SIGNIFICANT CHANGE UP
BLOOD UR QL VISUAL: NEGATIVE — SIGNIFICANT CHANGE UP
COD CRY URNS QL: SIGNIFICANT CHANGE UP (ref 0–0)
COLOR SPEC: YELLOW — SIGNIFICANT CHANGE UP
GLUCOSE UR-MCNC: NEGATIVE — SIGNIFICANT CHANGE UP
HCT VFR BLD CALC: 31.8 % — LOW (ref 34.5–45)
HGB BLD-MCNC: 10.3 G/DL — LOW (ref 11.5–15.5)
KETONES UR-MCNC: NEGATIVE — SIGNIFICANT CHANGE UP
LEUKOCYTE ESTERASE UR-ACNC: NEGATIVE — SIGNIFICANT CHANGE UP
MCHC RBC-ENTMCNC: 29.5 PG — SIGNIFICANT CHANGE UP (ref 27–34)
MCHC RBC-ENTMCNC: 32.4 % — SIGNIFICANT CHANGE UP (ref 32–36)
MCV RBC AUTO: 91.1 FL — SIGNIFICANT CHANGE UP (ref 80–100)
MUCOUS THREADS # UR AUTO: SIGNIFICANT CHANGE UP
NITRITE UR-MCNC: NEGATIVE — SIGNIFICANT CHANGE UP
NRBC # FLD: 0 — SIGNIFICANT CHANGE UP
PH UR: 6 — SIGNIFICANT CHANGE UP (ref 4.6–8)
PLATELET # BLD AUTO: 245 K/UL — SIGNIFICANT CHANGE UP (ref 150–400)
PMV BLD: 10.9 FL — SIGNIFICANT CHANGE UP (ref 7–13)
PROT UR-MCNC: 30 MG/DL — HIGH
RBC # BLD: 3.49 M/UL — LOW (ref 3.8–5.2)
RBC # FLD: 15.9 % — HIGH (ref 10.3–14.5)
RBC CASTS # UR COMP ASSIST: SIGNIFICANT CHANGE UP (ref 0–?)
RH IG SCN BLD-IMP: POSITIVE — SIGNIFICANT CHANGE UP
SP GR SPEC: 1.03 — SIGNIFICANT CHANGE UP (ref 1–1.04)
SQUAMOUS # UR AUTO: SIGNIFICANT CHANGE UP
UROBILINOGEN FLD QL: NORMAL MG/DL — SIGNIFICANT CHANGE UP
WBC # BLD: 6.86 K/UL — SIGNIFICANT CHANGE UP (ref 3.8–10.5)
WBC # FLD AUTO: 6.86 K/UL — SIGNIFICANT CHANGE UP (ref 3.8–10.5)
WBC UR QL: SIGNIFICANT CHANGE UP (ref 0–?)

## 2018-02-13 LAB — T PALLIDUM AB TITR SER: NEGATIVE — SIGNIFICANT CHANGE UP

## 2018-02-14 ENCOUNTER — RESULT REVIEW (OUTPATIENT)
Age: 38
End: 2018-02-14

## 2018-02-14 ENCOUNTER — INPATIENT (INPATIENT)
Facility: HOSPITAL | Age: 38
LOS: 2 days | Discharge: HOME | End: 2018-02-17
Attending: OBSTETRICS & GYNECOLOGY | Admitting: OBSTETRICS & GYNECOLOGY
Payer: COMMERCIAL

## 2018-02-14 VITALS — WEIGHT: 187.39 LBS | HEIGHT: 61 IN

## 2018-02-14 DIAGNOSIS — O36.5930 MATERNAL CARE FOR OTHER KNOWN OR SUSPECTED POOR FETAL GROWTH, THIRD TRIMESTER, NOT APPLICABLE OR UNSPECIFIED: ICD-10-CM

## 2018-02-14 DIAGNOSIS — O09.33 SUPERVISION OF PREGNANCY WITH INSUFFICIENT ANTENATAL CARE, THIRD TRIMESTER: ICD-10-CM

## 2018-02-14 DIAGNOSIS — O09.523 SUPERVISION OF ELDERLY MULTIGRAVIDA, THIRD TRIMESTER: ICD-10-CM

## 2018-02-14 DIAGNOSIS — O28.3 ABNORMAL ULTRASONIC FINDING ON ANTENATAL SCREENING OF MOTHER: ICD-10-CM

## 2018-02-14 DIAGNOSIS — O34.13 MATERNAL CARE FOR BENIGN TUMOR OF CORPUS UTERI, THIRD TRIMESTER: ICD-10-CM

## 2018-02-14 DIAGNOSIS — Z3A.36 36 WEEKS GESTATION OF PREGNANCY: ICD-10-CM

## 2018-02-14 LAB
BLD GP AB SCN SERPL QL: NEGATIVE — SIGNIFICANT CHANGE UP
HIV1 AG SER QL: SIGNIFICANT CHANGE UP
HIV1+2 AB SPEC QL: SIGNIFICANT CHANGE UP
RH IG SCN BLD-IMP: POSITIVE — SIGNIFICANT CHANGE UP

## 2018-02-14 PROCEDURE — 88305 TISSUE EXAM BY PATHOLOGIST: CPT | Mod: 26

## 2018-02-14 RX ORDER — SODIUM CHLORIDE 9 MG/ML
1000 INJECTION, SOLUTION INTRAVENOUS
Qty: 0 | Refills: 0 | Status: DISCONTINUED | OUTPATIENT
Start: 2018-02-14 | End: 2018-02-17

## 2018-02-14 RX ORDER — OXYTOCIN 10 UNIT/ML
41.67 VIAL (ML) INJECTION
Qty: 20 | Refills: 0 | Status: DISCONTINUED | OUTPATIENT
Start: 2018-02-14 | End: 2018-02-17

## 2018-02-14 RX ORDER — DIPHENHYDRAMINE HCL 50 MG
25 CAPSULE ORAL EVERY 6 HOURS
Qty: 0 | Refills: 0 | Status: DISCONTINUED | OUTPATIENT
Start: 2018-02-14 | End: 2018-02-17

## 2018-02-14 RX ORDER — DOCUSATE SODIUM 100 MG
100 CAPSULE ORAL
Qty: 0 | Refills: 0 | Status: DISCONTINUED | OUTPATIENT
Start: 2018-02-14 | End: 2018-02-14

## 2018-02-14 RX ORDER — FAMOTIDINE 10 MG/ML
20 INJECTION INTRAVENOUS ONCE
Qty: 0 | Refills: 0 | Status: DISCONTINUED | OUTPATIENT
Start: 2018-02-14 | End: 2018-02-14

## 2018-02-14 RX ORDER — GLYCERIN ADULT
1 SUPPOSITORY, RECTAL RECTAL AT BEDTIME
Qty: 0 | Refills: 0 | Status: DISCONTINUED | OUTPATIENT
Start: 2018-02-14 | End: 2018-02-17

## 2018-02-14 RX ORDER — METOCLOPRAMIDE HCL 10 MG
10 TABLET ORAL ONCE
Qty: 0 | Refills: 0 | Status: DISCONTINUED | OUTPATIENT
Start: 2018-02-14 | End: 2018-02-14

## 2018-02-14 RX ORDER — ASCORBIC ACID 60 MG
500 TABLET,CHEWABLE ORAL DAILY
Qty: 0 | Refills: 0 | Status: DISCONTINUED | OUTPATIENT
Start: 2018-02-14 | End: 2018-02-17

## 2018-02-14 RX ORDER — HYDROMORPHONE HYDROCHLORIDE 2 MG/ML
0.5 INJECTION INTRAMUSCULAR; INTRAVENOUS; SUBCUTANEOUS
Qty: 0 | Refills: 0 | Status: DISCONTINUED | OUTPATIENT
Start: 2018-02-14 | End: 2018-02-14

## 2018-02-14 RX ORDER — VANCOMYCIN HCL 1 G
1000 VIAL (EA) INTRAVENOUS ONCE
Qty: 0 | Refills: 0 | Status: COMPLETED | OUTPATIENT
Start: 2018-02-14 | End: 2018-02-14

## 2018-02-14 RX ORDER — CITRIC ACID/SODIUM CITRATE 300-500 MG
30 SOLUTION, ORAL ORAL ONCE
Qty: 0 | Refills: 0 | Status: DISCONTINUED | OUTPATIENT
Start: 2018-02-14 | End: 2018-02-14

## 2018-02-14 RX ORDER — VANCOMYCIN HCL 1 G
VIAL (EA) INTRAVENOUS
Qty: 0 | Refills: 0 | Status: DISCONTINUED | OUTPATIENT
Start: 2018-02-14 | End: 2018-02-14

## 2018-02-14 RX ORDER — IBUPROFEN 200 MG
600 TABLET ORAL EVERY 6 HOURS
Qty: 0 | Refills: 0 | Status: COMPLETED | OUTPATIENT
Start: 2018-02-14 | End: 2019-01-13

## 2018-02-14 RX ORDER — FERROUS SULFATE 325(65) MG
325 TABLET ORAL DAILY
Qty: 0 | Refills: 0 | Status: DISCONTINUED | OUTPATIENT
Start: 2018-02-14 | End: 2018-02-14

## 2018-02-14 RX ORDER — ONDANSETRON 8 MG/1
4 TABLET, FILM COATED ORAL EVERY 6 HOURS
Qty: 0 | Refills: 0 | Status: DISCONTINUED | OUTPATIENT
Start: 2018-02-14 | End: 2018-02-17

## 2018-02-14 RX ORDER — LANOLIN
1 OINTMENT (GRAM) TOPICAL
Qty: 0 | Refills: 0 | Status: DISCONTINUED | OUTPATIENT
Start: 2018-02-14 | End: 2018-02-17

## 2018-02-14 RX ORDER — ACETAMINOPHEN 500 MG
975 TABLET ORAL EVERY 6 HOURS
Qty: 0 | Refills: 0 | Status: DISCONTINUED | OUTPATIENT
Start: 2018-02-14 | End: 2018-02-17

## 2018-02-14 RX ORDER — VANCOMYCIN HCL 1 G
1000 VIAL (EA) INTRAVENOUS EVERY 12 HOURS
Qty: 0 | Refills: 0 | Status: DISCONTINUED | OUTPATIENT
Start: 2018-02-14 | End: 2018-02-14

## 2018-02-14 RX ORDER — TETANUS TOXOID, REDUCED DIPHTHERIA TOXOID AND ACELLULAR PERTUSSIS VACCINE, ADSORBED 5; 2.5; 8; 8; 2.5 [IU]/.5ML; [IU]/.5ML; UG/.5ML; UG/.5ML; UG/.5ML
0.5 SUSPENSION INTRAMUSCULAR ONCE
Qty: 0 | Refills: 0 | Status: DISCONTINUED | OUTPATIENT
Start: 2018-02-14 | End: 2018-02-17

## 2018-02-14 RX ORDER — DIPHENHYDRAMINE HCL 50 MG
25 CAPSULE ORAL EVERY 4 HOURS
Qty: 0 | Refills: 0 | Status: DISCONTINUED | OUTPATIENT
Start: 2018-02-14 | End: 2018-02-17

## 2018-02-14 RX ORDER — KETOROLAC TROMETHAMINE 30 MG/ML
30 SYRINGE (ML) INJECTION EVERY 6 HOURS
Qty: 0 | Refills: 0 | Status: COMPLETED | OUTPATIENT
Start: 2018-02-14 | End: 2018-02-16

## 2018-02-14 RX ORDER — OXYTOCIN 10 UNIT/ML
333.33 VIAL (ML) INJECTION
Qty: 20 | Refills: 0 | Status: DISCONTINUED | OUTPATIENT
Start: 2018-02-14 | End: 2018-02-14

## 2018-02-14 RX ORDER — SODIUM CHLORIDE 9 MG/ML
1000 INJECTION, SOLUTION INTRAVENOUS
Qty: 0 | Refills: 0 | Status: DISCONTINUED | OUTPATIENT
Start: 2018-02-14 | End: 2018-02-14

## 2018-02-14 RX ORDER — METOCLOPRAMIDE HCL 10 MG
10 TABLET ORAL ONCE
Qty: 0 | Refills: 0 | Status: COMPLETED | OUTPATIENT
Start: 2018-02-14 | End: 2018-02-14

## 2018-02-14 RX ORDER — DOCUSATE SODIUM 100 MG
100 CAPSULE ORAL
Qty: 0 | Refills: 0 | Status: DISCONTINUED | OUTPATIENT
Start: 2018-02-14 | End: 2018-02-17

## 2018-02-14 RX ORDER — OXYCODONE HYDROCHLORIDE 5 MG/1
5 TABLET ORAL
Qty: 0 | Refills: 0 | Status: DISCONTINUED | OUTPATIENT
Start: 2018-02-14 | End: 2018-02-17

## 2018-02-14 RX ORDER — NALOXONE HYDROCHLORIDE 4 MG/.1ML
0.1 SPRAY NASAL
Qty: 0 | Refills: 0 | Status: DISCONTINUED | OUTPATIENT
Start: 2018-02-14 | End: 2018-02-17

## 2018-02-14 RX ORDER — SIMETHICONE 80 MG/1
80 TABLET, CHEWABLE ORAL EVERY 4 HOURS
Qty: 0 | Refills: 0 | Status: DISCONTINUED | OUTPATIENT
Start: 2018-02-14 | End: 2018-02-17

## 2018-02-14 RX ORDER — CITRIC ACID/SODIUM CITRATE 300-500 MG
30 SOLUTION, ORAL ORAL ONCE
Qty: 0 | Refills: 0 | Status: COMPLETED | OUTPATIENT
Start: 2018-02-14 | End: 2018-02-14

## 2018-02-14 RX ORDER — OXYTOCIN 10 UNIT/ML
41.67 VIAL (ML) INJECTION
Qty: 20 | Refills: 0 | Status: DISCONTINUED | OUTPATIENT
Start: 2018-02-14 | End: 2018-02-14

## 2018-02-14 RX ORDER — HEPARIN SODIUM 5000 [USP'U]/ML
5000 INJECTION INTRAVENOUS; SUBCUTANEOUS EVERY 12 HOURS
Qty: 0 | Refills: 0 | Status: DISCONTINUED | OUTPATIENT
Start: 2018-02-14 | End: 2018-02-17

## 2018-02-14 RX ORDER — MORPHINE SULFATE 50 MG/1
0.2 CAPSULE, EXTENDED RELEASE ORAL ONCE
Qty: 0 | Refills: 0 | Status: DISCONTINUED | OUTPATIENT
Start: 2018-02-14 | End: 2018-02-14

## 2018-02-14 RX ORDER — OXYCODONE HYDROCHLORIDE 5 MG/1
5 TABLET ORAL EVERY 4 HOURS
Qty: 0 | Refills: 0 | Status: DISCONTINUED | OUTPATIENT
Start: 2018-02-14 | End: 2018-02-17

## 2018-02-14 RX ORDER — OXYCODONE HYDROCHLORIDE 5 MG/1
10 TABLET ORAL
Qty: 0 | Refills: 0 | Status: DISCONTINUED | OUTPATIENT
Start: 2018-02-14 | End: 2018-02-17

## 2018-02-14 RX ORDER — CITRIC ACID/SODIUM CITRATE 300-500 MG
15 SOLUTION, ORAL ORAL EVERY 4 HOURS
Qty: 0 | Refills: 0 | Status: DISCONTINUED | OUTPATIENT
Start: 2018-02-14 | End: 2018-02-14

## 2018-02-14 RX ORDER — SODIUM CHLORIDE 9 MG/ML
1000 INJECTION, SOLUTION INTRAVENOUS ONCE
Qty: 0 | Refills: 0 | Status: DISCONTINUED | OUTPATIENT
Start: 2018-02-14 | End: 2018-02-14

## 2018-02-14 RX ORDER — FAMOTIDINE 10 MG/ML
20 INJECTION INTRAVENOUS ONCE
Qty: 0 | Refills: 0 | Status: COMPLETED | OUTPATIENT
Start: 2018-02-14 | End: 2018-02-14

## 2018-02-14 RX ORDER — FERROUS SULFATE 325(65) MG
325 TABLET ORAL
Qty: 0 | Refills: 0 | Status: DISCONTINUED | OUTPATIENT
Start: 2018-02-14 | End: 2018-02-17

## 2018-02-14 RX ADMIN — SODIUM CHLORIDE 250 MILLILITER(S): 9 INJECTION, SOLUTION INTRAVENOUS at 12:12

## 2018-02-14 RX ADMIN — FAMOTIDINE 20 MILLIGRAM(S): 10 INJECTION INTRAVENOUS at 15:13

## 2018-02-14 RX ADMIN — SODIUM CHLORIDE 125 MILLILITER(S): 9 INJECTION, SOLUTION INTRAVENOUS at 10:34

## 2018-02-14 RX ADMIN — HEPARIN SODIUM 5000 UNIT(S): 5000 INJECTION INTRAVENOUS; SUBCUTANEOUS at 23:56

## 2018-02-14 RX ADMIN — Medication 30 MILLILITER(S): at 15:19

## 2018-02-14 RX ADMIN — SODIUM CHLORIDE 75 MILLILITER(S): 9 INJECTION, SOLUTION INTRAVENOUS at 17:41

## 2018-02-14 RX ADMIN — Medication 250 MILLIGRAM(S): at 10:33

## 2018-02-14 RX ADMIN — Medication 30 MILLIGRAM(S): at 23:57

## 2018-02-14 RX ADMIN — Medication 125 MILLIUNIT(S)/MIN: at 17:41

## 2018-02-14 RX ADMIN — Medication 10 MILLIGRAM(S): at 15:12

## 2018-02-15 ENCOUNTER — TRANSCRIPTION ENCOUNTER (OUTPATIENT)
Age: 38
End: 2018-02-15

## 2018-02-15 LAB
BASOPHILS # BLD AUTO: 0.03 K/UL — SIGNIFICANT CHANGE UP (ref 0–0.2)
BASOPHILS NFR BLD AUTO: 0.3 % — SIGNIFICANT CHANGE UP (ref 0–2)
EOSINOPHIL # BLD AUTO: 0.06 K/UL — SIGNIFICANT CHANGE UP (ref 0–0.5)
EOSINOPHIL NFR BLD AUTO: 0.6 % — SIGNIFICANT CHANGE UP (ref 0–6)
HCT VFR BLD CALC: 24.7 % — LOW (ref 34.5–45)
HCT VFR BLD CALC: 26 % — LOW (ref 34.5–45)
HGB BLD-MCNC: 7.9 G/DL — LOW (ref 11.5–15.5)
HGB BLD-MCNC: 8.1 G/DL — LOW (ref 11.5–15.5)
IMM GRANULOCYTES # BLD AUTO: 0.07 # — SIGNIFICANT CHANGE UP
IMM GRANULOCYTES NFR BLD AUTO: 0.7 % — SIGNIFICANT CHANGE UP (ref 0–1.5)
LYMPHOCYTES # BLD AUTO: 1.53 K/UL — SIGNIFICANT CHANGE UP (ref 1–3.3)
LYMPHOCYTES # BLD AUTO: 15.2 % — SIGNIFICANT CHANGE UP (ref 13–44)
MCHC RBC-ENTMCNC: 28.1 PG — SIGNIFICANT CHANGE UP (ref 27–34)
MCHC RBC-ENTMCNC: 28.8 PG — SIGNIFICANT CHANGE UP (ref 27–34)
MCHC RBC-ENTMCNC: 31.2 % — LOW (ref 32–36)
MCHC RBC-ENTMCNC: 32 % — SIGNIFICANT CHANGE UP (ref 32–36)
MCV RBC AUTO: 90.1 FL — SIGNIFICANT CHANGE UP (ref 80–100)
MCV RBC AUTO: 90.3 FL — SIGNIFICANT CHANGE UP (ref 80–100)
MONOCYTES # BLD AUTO: 0.68 K/UL — SIGNIFICANT CHANGE UP (ref 0–0.9)
MONOCYTES NFR BLD AUTO: 6.8 % — SIGNIFICANT CHANGE UP (ref 2–14)
NEUTROPHILS # BLD AUTO: 7.69 K/UL — HIGH (ref 1.8–7.4)
NEUTROPHILS NFR BLD AUTO: 76.4 % — SIGNIFICANT CHANGE UP (ref 43–77)
NRBC # FLD: 0 — SIGNIFICANT CHANGE UP
NRBC # FLD: 0 — SIGNIFICANT CHANGE UP
PLATELET # BLD AUTO: 218 K/UL — SIGNIFICANT CHANGE UP (ref 150–400)
PLATELET # BLD AUTO: 226 K/UL — SIGNIFICANT CHANGE UP (ref 150–400)
PMV BLD: 10.7 FL — SIGNIFICANT CHANGE UP (ref 7–13)
PMV BLD: 10.8 FL — SIGNIFICANT CHANGE UP (ref 7–13)
RBC # BLD: 2.74 M/UL — LOW (ref 3.8–5.2)
RBC # BLD: 2.88 M/UL — LOW (ref 3.8–5.2)
RBC # FLD: 16.1 % — HIGH (ref 10.3–14.5)
RBC # FLD: 16.2 % — HIGH (ref 10.3–14.5)
WBC # BLD: 10.06 K/UL — SIGNIFICANT CHANGE UP (ref 3.8–10.5)
WBC # BLD: 9.67 K/UL — SIGNIFICANT CHANGE UP (ref 3.8–10.5)
WBC # FLD AUTO: 10.06 K/UL — SIGNIFICANT CHANGE UP (ref 3.8–10.5)
WBC # FLD AUTO: 9.67 K/UL — SIGNIFICANT CHANGE UP (ref 3.8–10.5)

## 2018-02-15 RX ORDER — IBUPROFEN 200 MG
600 TABLET ORAL EVERY 6 HOURS
Qty: 0 | Refills: 0 | Status: DISCONTINUED | OUTPATIENT
Start: 2018-02-15 | End: 2018-02-17

## 2018-02-15 RX ADMIN — Medication 100 MILLIGRAM(S): at 08:32

## 2018-02-15 RX ADMIN — Medication 30 MILLIGRAM(S): at 06:01

## 2018-02-15 RX ADMIN — Medication 600 MILLIGRAM(S): at 18:48

## 2018-02-15 RX ADMIN — Medication 30 MILLIGRAM(S): at 00:27

## 2018-02-15 RX ADMIN — Medication 975 MILLIGRAM(S): at 12:58

## 2018-02-15 RX ADMIN — Medication 975 MILLIGRAM(S): at 02:48

## 2018-02-15 RX ADMIN — Medication 975 MILLIGRAM(S): at 18:34

## 2018-02-15 RX ADMIN — Medication 30 MILLIGRAM(S): at 13:00

## 2018-02-15 RX ADMIN — Medication 325 MILLIGRAM(S): at 12:59

## 2018-02-15 RX ADMIN — Medication 1 TABLET(S): at 13:00

## 2018-02-15 RX ADMIN — Medication 30 MILLIGRAM(S): at 13:20

## 2018-02-15 RX ADMIN — Medication 100 MILLIGRAM(S): at 12:59

## 2018-02-15 RX ADMIN — Medication 325 MILLIGRAM(S): at 08:32

## 2018-02-15 RX ADMIN — Medication 30 MILLIGRAM(S): at 06:31

## 2018-02-15 RX ADMIN — HEPARIN SODIUM 5000 UNIT(S): 5000 INJECTION INTRAVENOUS; SUBCUTANEOUS at 13:00

## 2018-02-15 RX ADMIN — Medication 975 MILLIGRAM(S): at 13:20

## 2018-02-15 RX ADMIN — Medication 975 MILLIGRAM(S): at 02:18

## 2018-02-15 RX ADMIN — Medication 975 MILLIGRAM(S): at 18:47

## 2018-02-15 RX ADMIN — Medication 500 MILLIGRAM(S): at 12:59

## 2018-02-15 RX ADMIN — Medication 600 MILLIGRAM(S): at 18:35

## 2018-02-15 NOTE — PROGRESS NOTE ADULT - SUBJECTIVE AND OBJECTIVE BOX
Patient seen and examined at bedside, no acute overnight events. No acute complaints, pain well controlled. Denies any HA, blurry vision, dizziness, CP/SOB, N/V. Patient is ambulating and tolerating regular diet. Has not yet passed flatus. Guzman is still in place. Pt is bottle feeding her baby.    Vital Signs Last 24 Hours  T(C): 36.9 (02-15-18 @ 06:39), Max: 36.9 (02-15-18 @ 06:39)  HR: 98 (02-15-18 @ 06:39) (62 - 98)  BP: 111/58 (02-15-18 @ 06:39) (95/48 - 121/60)  RR: 18 (02-15-18 @ 06:39) (14 - 20)  SpO2: 95% (02-15-18 @ 06:39) (95% - 100%)    I&O's Summary    14 Feb 2018 07:01  -  15 Feb 2018 07:00  --------------------------------------------------------  IN: 5650 mL / OUT: 1974 mL / NET: 3676 mL        Physical Exam:  General: NAD  CV: RRR  Pulm: CTAB  Abdomen: Soft, non-tender, non-distended  Incision: Pfannenstiel incision CDI, subcuticular suture closure  Pelvic: Lochia wnl; fundus firm and non-tender   Extremities: no calf tenderness noted on exam    Labs:    Blood Type: A Positive  Antibody Screen: Negative  RPR: Negative               10.3   6.86  )-----------( 245      ( 02-12 @ 09:45 )             31.8         MEDICATIONS  (STANDING):  acetaminophen   Tablet. 975 milliGRAM(s) Oral every 6 hours  ascorbic acid 500 milliGRAM(s) Oral daily  diphtheria/tetanus/pertussis (acellular) Vaccine (ADAcel) 0.5 milliLiter(s) IntraMuscular once  docusate sodium 100 milliGRAM(s) Oral two times a day  ferrous    sulfate 325 milliGRAM(s) Oral three times a day with meals  heparin  Injectable 5000 Unit(s) SubCutaneous every 12 hours  ibuprofen  Tablet 600 milliGRAM(s) Oral every 6 hours  ketorolac   Injectable 30 milliGRAM(s) IV Push every 6 hours  lactated ringers. 1000 milliLiter(s) (125 mL/Hr) IV Continuous <Continuous>  lactated ringers. 1000 milliLiter(s) (75 mL/Hr) IV Continuous <Continuous>  oxyCODONE    IR 5 milliGRAM(s) Oral every 3 hours  oxytocin Infusion 41.667 milliUNIT(s)/Min (125 mL/Hr) IV Continuous <Continuous>  prenatal multivitamin 1 Tablet(s) Oral daily    MEDICATIONS  (PRN):  diphenhydrAMINE   Capsule 25 milliGRAM(s) Oral every 6 hours PRN Itching  diphenhydrAMINE   Injectable 25 milliGRAM(s) IV Push every 4 hours PRN Pruritus  glycerin Suppository - Adult 1 Suppository(s) Rectal at bedtime PRN Constipation  HYDROmorphone  Injectable 0.5 milliGRAM(s) IV Push every 3 hours PRN Severe Pain  lanolin Ointment 1 Application(s) Topical every 3 hours PRN Sore Nipples  naloxone Injectable 0.1 milliGRAM(s) IV Push every 3 minutes PRN For ANY of the following changes in patient status:  A. RR LESS THAN 10 breaths per minute, B. Oxygen saturation LESS THAN 90%, C. Sedation score of 6  ondansetron Injectable 4 milliGRAM(s) IV Push every 6 hours PRN Nausea  oxyCODONE    IR 5 milliGRAM(s) Oral every 3 hours PRN Mild Pain  oxyCODONE    IR 10 milliGRAM(s) Oral every 3 hours PRN Moderate Pain  oxyCODONE    IR 5 milliGRAM(s) Oral every 4 hours PRN Severe Pain (7 - 10)  simethicone 80 milliGRAM(s) Chew every 4 hours PRN Gas

## 2018-02-15 NOTE — PROGRESS NOTE ADULT - SUBJECTIVE AND OBJECTIVE BOX
S: 37y POD#1  Patient doing well. Minimal to moderate lochia. Pain controlled. Guzman draining clear yellow urine. Passing Flatus. Tolerating a regular diet.   denies CP, palpitations  O: Vital Signs Last 24 Hrs  T(C): 36.8 (15 Feb 2018 10:25), Max: 36.9 (15 Feb 2018 06:39)  T(F): 98.2 (15 Feb 2018 10:25), Max: 98.5 (15 Feb 2018 06:39)  HR: 97 (15 Feb 2018 10:25) (62 - 98)  BP: 97/56 (15 Feb 2018 10:25) (95/48 - 121/60)  BP(mean): 89 (14 Feb 2018 20:30) (69 - 89)  RR: 18 (15 Feb 2018 10:25) (14 - 20)  SpO2: 100% (15 Feb 2018 10:25) (95% - 100%)    Gen: NAD A+Ox3  Heart: S1S2 RRR  Lungs: CTA B/L  Abd: soft, NT, ND, fundus firm below umbilicus  Incision: clean, dry, intact  Lochia: minimal to moderate  Ext: no tenderness b/l    Labs:                        7.9    10.06 )-----------( 218      ( 15 Feb 2018 07:42 )             24.7       A: 37y POD#1 s/p CD. Doing well. girl doing well    Plan: Increase ambulation. Advance diet as tolerated. Tylenol and Motrin q6 hrs. Oxycodone prn.   DVT prophylaxis. Routine postop care.  postop anemia: increase iron to bid  repeat cbc in am

## 2018-02-15 NOTE — PROGRESS NOTE ADULT - PROBLEM SELECTOR PLAN 1
- Continue with po analgesia  - Increase ambulation  - Continue regular diet  - d/c IV fluids  - Check CBC  - D/c Guzman  - Incision dressing removed    MAGDALENA Potts, PGY-1

## 2018-02-15 NOTE — PROGRESS NOTE ADULT - SUBJECTIVE AND OBJECTIVE BOX
ANESTHESIA POSTOP CHECK    37y Female POSTOP DAY 1     No COMPLAINTS    NO APPARENT ANESTHESIA COMPLICATIONS

## 2018-02-16 ENCOUNTER — TRANSCRIPTION ENCOUNTER (OUTPATIENT)
Age: 38
End: 2018-02-16

## 2018-02-16 LAB
HCT VFR BLD CALC: 28.3 % — LOW (ref 34.5–45)
HGB BLD-MCNC: 9.1 G/DL — LOW (ref 11.5–15.5)
MCHC RBC-ENTMCNC: 28.8 PG — SIGNIFICANT CHANGE UP (ref 27–34)
MCHC RBC-ENTMCNC: 32.2 % — SIGNIFICANT CHANGE UP (ref 32–36)
MCV RBC AUTO: 89.6 FL — SIGNIFICANT CHANGE UP (ref 80–100)
NRBC # FLD: 0 — SIGNIFICANT CHANGE UP
PLATELET # BLD AUTO: 243 K/UL — SIGNIFICANT CHANGE UP (ref 150–400)
PMV BLD: 10.8 FL — SIGNIFICANT CHANGE UP (ref 7–13)
RBC # BLD: 3.16 M/UL — LOW (ref 3.8–5.2)
RBC # FLD: 16.7 % — HIGH (ref 10.3–14.5)
WBC # BLD: 9.97 K/UL — SIGNIFICANT CHANGE UP (ref 3.8–10.5)
WBC # FLD AUTO: 9.97 K/UL — SIGNIFICANT CHANGE UP (ref 3.8–10.5)

## 2018-02-16 RX ADMIN — Medication 325 MILLIGRAM(S): at 12:33

## 2018-02-16 RX ADMIN — Medication 600 MILLIGRAM(S): at 13:30

## 2018-02-16 RX ADMIN — Medication 600 MILLIGRAM(S): at 00:25

## 2018-02-16 RX ADMIN — Medication 500 MILLIGRAM(S): at 12:32

## 2018-02-16 RX ADMIN — Medication 975 MILLIGRAM(S): at 13:30

## 2018-02-16 RX ADMIN — Medication 325 MILLIGRAM(S): at 09:53

## 2018-02-16 RX ADMIN — Medication 600 MILLIGRAM(S): at 12:33

## 2018-02-16 RX ADMIN — Medication 975 MILLIGRAM(S): at 00:55

## 2018-02-16 RX ADMIN — Medication 600 MILLIGRAM(S): at 06:46

## 2018-02-16 RX ADMIN — Medication 325 MILLIGRAM(S): at 17:49

## 2018-02-16 RX ADMIN — Medication 1 TABLET(S): at 12:33

## 2018-02-16 RX ADMIN — Medication 975 MILLIGRAM(S): at 00:25

## 2018-02-16 RX ADMIN — Medication 975 MILLIGRAM(S): at 17:50

## 2018-02-16 RX ADMIN — Medication 975 MILLIGRAM(S): at 12:32

## 2018-02-16 RX ADMIN — Medication 100 MILLIGRAM(S): at 17:49

## 2018-02-16 RX ADMIN — Medication 975 MILLIGRAM(S): at 06:16

## 2018-02-16 RX ADMIN — Medication 100 MILLIGRAM(S): at 06:16

## 2018-02-16 RX ADMIN — Medication 600 MILLIGRAM(S): at 17:49

## 2018-02-16 RX ADMIN — Medication 975 MILLIGRAM(S): at 06:46

## 2018-02-16 RX ADMIN — HEPARIN SODIUM 5000 UNIT(S): 5000 INJECTION INTRAVENOUS; SUBCUTANEOUS at 00:25

## 2018-02-16 RX ADMIN — Medication 600 MILLIGRAM(S): at 06:16

## 2018-02-16 RX ADMIN — Medication 600 MILLIGRAM(S): at 00:55

## 2018-02-16 RX ADMIN — Medication 600 MILLIGRAM(S): at 18:40

## 2018-02-16 RX ADMIN — HEPARIN SODIUM 5000 UNIT(S): 5000 INJECTION INTRAVENOUS; SUBCUTANEOUS at 12:33

## 2018-02-16 RX ADMIN — Medication 975 MILLIGRAM(S): at 18:40

## 2018-02-16 NOTE — PROGRESS NOTE ADULT - PROBLEM SELECTOR PLAN 1
- Continue with po analgesia  - Increase ambulation  - Continue regular diet  - IV lock  - No labs    GUILLERMINA Bello, PGY-1

## 2018-02-16 NOTE — PROGRESS NOTE ADULT - SUBJECTIVE AND OBJECTIVE BOX
Patient seen and examined at bedside, no acute overnight events. No acute complaints, pain well controlled. Denies any HA, blurry vision, lightheadedness, CP/SOB, N/V. Patient is ambulating, voiding spontaneously, passing flatus, and tolerating regular diet. Pt is breast feeding her baby.    Vital Signs Last 24 Hours  T(C): 36.8 (02-16-18 @ 06:00), Max: 37.2 (02-15-18 @ 13:48)  HR: 81 (02-16-18 @ 06:00) (81 - 97)  BP: 118/58 (02-16-18 @ 06:00) (115/60 - 122/55)  RR: 17 (02-16-18 @ 06:00) (17 - 18)  SpO2: 100% (02-16-18 @ 06:00) (100% - 100%)    Physical Exam:  General: NAD  CV: RRR  Pulm: CTAB  Abdomen: Soft, non-tender, non-distended  Incision: Pfannenstiel incision CDI, subcuticular suture closure  Pelvic: Lochia wnl; fundus firm and non-tender   Extremities: no calf tenderness noted on exam    Labs:    Blood Type: A Positive  Antibody Screen: Negative  RPR: Negative               9.1    9.97  )-----------( 243      ( 02-16 @ 06:30 )             28.3                8.1    9.67  )-----------( 226      ( 02-15 @ 17:15 )             26.0                7.9    10.06 )-----------( 218      ( 02-15 @ 07:42 )             24.7         MEDICATIONS  (STANDING):  acetaminophen   Tablet. 975 milliGRAM(s) Oral every 6 hours  ascorbic acid 500 milliGRAM(s) Oral daily  diphtheria/tetanus/pertussis (acellular) Vaccine (ADAcel) 0.5 milliLiter(s) IntraMuscular once  docusate sodium 100 milliGRAM(s) Oral two times a day  ferrous    sulfate 325 milliGRAM(s) Oral three times a day with meals  heparin  Injectable 5000 Unit(s) SubCutaneous every 12 hours  ibuprofen  Tablet 600 milliGRAM(s) Oral every 6 hours  ketorolac   Injectable 30 milliGRAM(s) IV Push every 6 hours  lactated ringers. 1000 milliLiter(s) (125 mL/Hr) IV Continuous <Continuous>  lactated ringers. 1000 milliLiter(s) (75 mL/Hr) IV Continuous <Continuous>  oxyCODONE    IR 5 milliGRAM(s) Oral every 3 hours  oxytocin Infusion 41.667 milliUNIT(s)/Min (125 mL/Hr) IV Continuous <Continuous>  prenatal multivitamin 1 Tablet(s) Oral daily    MEDICATIONS  (PRN):  diphenhydrAMINE   Capsule 25 milliGRAM(s) Oral every 6 hours PRN Itching  diphenhydrAMINE   Injectable 25 milliGRAM(s) IV Push every 4 hours PRN Pruritus  glycerin Suppository - Adult 1 Suppository(s) Rectal at bedtime PRN Constipation  lanolin Ointment 1 Application(s) Topical every 3 hours PRN Sore Nipples  naloxone Injectable 0.1 milliGRAM(s) IV Push every 3 minutes PRN For ANY of the following changes in patient status:  A. RR LESS THAN 10 breaths per minute, B. Oxygen saturation LESS THAN 90%, C. Sedation score of 6  ondansetron Injectable 4 milliGRAM(s) IV Push every 6 hours PRN Nausea  oxyCODONE    IR 5 milliGRAM(s) Oral every 3 hours PRN Mild Pain  oxyCODONE    IR 10 milliGRAM(s) Oral every 3 hours PRN Moderate Pain  oxyCODONE    IR 5 milliGRAM(s) Oral every 4 hours PRN Severe Pain (7 - 10)  simethicone 80 milliGRAM(s) Chew every 4 hours PRN Gas

## 2018-02-16 NOTE — PROGRESS NOTE ADULT - SUBJECTIVE AND OBJECTIVE BOX
S: 37y POD#2  Patient doing well. Minimal to moderate lochia. Pain controlled. Voiding. Passing Flatus. Tolerating a regular diet.     O: Vital Signs Last 24 Hrs  T(C): 37 (15 Feb 2018 22:02), Max: 37.2 (15 Feb 2018 13:48)  T(F): 98.6 (15 Feb 2018 22:02), Max: 99 (15 Feb 2018 13:48)  HR: 89 (15 Feb 2018 22:02) (89 - 97)  BP: 122/55 (15 Feb 2018 22:02) (97/56 - 122/55)  RR: 18 (15 Feb 2018 22:02) (18 - 18)  SpO2: 100% (15 Feb 2018 22:02) (100% - 100%)    Gen: NAD A+Ox3  Abd: soft, NT, ND, fundus firm below umbilicus  Incision: clean, dry, intact, dry blood on steri strips, no bleeding no fluid,  Lochia: minimal to moderate  Ext: no tenderness b/l    Labs:                        8.1    9.67  )-----------( 226      ( 15 Feb 2018 17:15 )             26.0       A: 37y POD#2 s/p CD. Doing well.  CBC stable, patient is breastfeeding  Plan: Increase ambulation. Advance diet as tolerated. Tylenol and Motrin q6 hrs. Oxycodone prn.   DVT prophylaxis. Routine postop care, change steri strips.

## 2018-02-17 VITALS
DIASTOLIC BLOOD PRESSURE: 69 MMHG | TEMPERATURE: 98 F | OXYGEN SATURATION: 98 % | RESPIRATION RATE: 18 BRPM | HEART RATE: 80 BPM | SYSTOLIC BLOOD PRESSURE: 117 MMHG

## 2018-02-17 RX ORDER — ACETAMINOPHEN 500 MG
3 TABLET ORAL
Qty: 0 | Refills: 0 | DISCHARGE
Start: 2018-02-17

## 2018-02-17 RX ADMIN — Medication 600 MILLIGRAM(S): at 01:13

## 2018-02-17 RX ADMIN — HEPARIN SODIUM 5000 UNIT(S): 5000 INJECTION INTRAVENOUS; SUBCUTANEOUS at 01:13

## 2018-02-17 RX ADMIN — Medication 975 MILLIGRAM(S): at 01:45

## 2018-02-17 RX ADMIN — Medication 975 MILLIGRAM(S): at 11:25

## 2018-02-17 RX ADMIN — Medication 600 MILLIGRAM(S): at 01:45

## 2018-02-17 RX ADMIN — Medication 600 MILLIGRAM(S): at 10:26

## 2018-02-17 RX ADMIN — Medication 975 MILLIGRAM(S): at 01:13

## 2018-02-17 RX ADMIN — Medication 975 MILLIGRAM(S): at 10:28

## 2018-02-17 RX ADMIN — Medication 600 MILLIGRAM(S): at 11:25

## 2018-02-17 RX ADMIN — Medication 325 MILLIGRAM(S): at 10:26

## 2018-02-17 NOTE — DISCHARGE NOTE OB - HOSPITAL COURSE
patient was admitted for induction of labor at early term for fetal indication. She underwent  section for non-reassuring fetal heart tracing.   Patient was diagnosed with postpartum anemia, which was improved on discharge on iron therapy. Hospital course uncomplicated otherwise. Patient discharged home to follow up with obstetrician

## 2018-02-17 NOTE — PROGRESS NOTE ADULT - SUBJECTIVE AND OBJECTIVE BOX
Postpartum Note,  Section   37y year old woman post-operative day 3 from uncomplicated primary  LTCS.  No acute events overnight.  Patient has no complaints and pain is well-controlled.  Patient is tolerating regular diet, passing flatus, ambulating, and is voiding spontaneously.  Postpartum bleeding is well controlled.    Physical exam:    Vital Signs Last 24 Hrs  T(C): 36.8 (2018 04:24), Max: 37.3 (2018 17:56)  T(F): 98.2 (2018 04:24), Max: 99.1 (2018 17:56)  HR: 80 (2018 04:24) (80 - 100)  BP: 117/69 (2018 04:24) (113/66 - 124/71)  BP(mean): --  RR: 18 (2018 04:24) (18 - 18)  SpO2: 98% (2018 04:24) (98% - 100%)    Gen: NAD  Abdomen: Soft, nontender, no distension , firm uterine fundus at umbilicus.  Incision: Clean, dry, and intact  Pelvic: Normal lochia noted  Ext: No calf tenderness    LABS:                        9.1    9.97  )-----------( 243      ( 2018 06:30 )             28.3                         8.1    9.67  )-----------( 226      ( 15 Feb 2018 17:15 )             26.0

## 2018-02-17 NOTE — DISCHARGE NOTE OB - PLAN OF CARE
complete recovery regular diet, activities as tolerated, nothing per vagina, follow up with obstetrician in 10-14 days resolution of anemia

## 2018-02-17 NOTE — DISCHARGE NOTE OB - MATERIALS PROVIDED
Guide to Postpartum Care/[facility] Hearing Screen Program/Vaccinations/  Immunization Record/Birth Certificate Instructions/[facility]  Screening Program/Shaken Baby Prevention Handout

## 2018-02-17 NOTE — PROGRESS NOTE ADULT - PROBLEM SELECTOR PLAN 1
-Encourage Ambulation  -Continue with regular diet  -Heparin, SCDs, and ambulation for DVT ppx  -Analgesia as needed  -discharge home    Wesly Reynolds MD PGY1

## 2018-02-17 NOTE — DISCHARGE NOTE OB - MEDICATION SUMMARY - MEDICATIONS TO TAKE
I will START or STAY ON the medications listed below when I get home from the hospital:    acetaminophen 325 mg oral tablet  -- 3 tab(s) by mouth every 6 hours  -- Indication: For pain    ferrous sulfate 325 mg (65 mg elemental iron) oral tablet  -- 1 tab(s) by mouth 2 times a day  -- Indication: For supplement

## 2018-02-17 NOTE — DISCHARGE NOTE OB - CARE PROVIDER_API CALL
Angelina Wilkins), Obstetrics and Gynecology  [address] [place] [address]  Phone: [phone]  Fax: [phone]

## 2018-02-17 NOTE — DISCHARGE NOTE OB - CARE PLAN
Principal Discharge DX:	 delivery delivered  Goal:	complete recovery  Assessment and plan of treatment:	regular diet, activities as tolerated, nothing per vagina, follow up with obstetrician in 10-14 days  Secondary Diagnosis:	Anemia, postpartum  Goal:	resolution of anemia

## 2018-02-17 NOTE — LACTATION INITIAL EVALUATION - LACTATION INTERVENTIONS
Instructed client to breastfeed on demand or 8-12 times within 24hrs. Instructed client to observe for feeding and satiety cues. Instructed client to position infant correctly and observe for wide gape in mouth before latching in an effort to achieve a deeper latch and prevent nipple pain and/or damage. Client given instruction on manual breast pump usage. Reviewed safety during feeding sessions, safe skin to skin and safe sleep/initiate skin to skin/initiate hand expression routine

## 2018-02-17 NOTE — DISCHARGE NOTE OB - PATIENT PORTAL LINK FT
You can access the [facility] Patient Portal, offered by [facility], by registering with the following website: [email or web address]

## 2019-04-23 NOTE — DISCHARGE NOTE OB - MOOD SWINGS / DEPRESSION OR CRYING SPELLS LASTING MORE THAN 3 DAYS
2 wk PO lipoma removal with abdominal wall exploration - she was seen in [facility] ER on 4/20/19 and [facility] ER on 4/21/19, states she is still having increased pain and she is completely out of pain medication   Statement Selected

## 2021-10-17 ENCOUNTER — INPATIENT (INPATIENT)
Facility: HOSPITAL | Age: 41
LOS: 1 days | Discharge: HOME | End: 2021-10-19
Attending: HOSPITALIST | Admitting: HOSPITALIST
Payer: MEDICAID

## 2021-10-17 VITALS
HEART RATE: 83 BPM | TEMPERATURE: 98 F | SYSTOLIC BLOOD PRESSURE: 119 MMHG | DIASTOLIC BLOOD PRESSURE: 75 MMHG | RESPIRATION RATE: 16 BRPM | OXYGEN SATURATION: 100 % | HEIGHT: 61 IN

## 2021-10-17 LAB
ALBUMIN SERPL ELPH-MCNC: 4 G/DL — SIGNIFICANT CHANGE UP (ref 3.3–5)
ALP SERPL-CCNC: 68 U/L — SIGNIFICANT CHANGE UP (ref 40–120)
ALT FLD-CCNC: 33 U/L — SIGNIFICANT CHANGE UP (ref 4–33)
ANION GAP SERPL CALC-SCNC: 14 MMOL/L — SIGNIFICANT CHANGE UP (ref 7–14)
APPEARANCE UR: ABNORMAL
APPEARANCE UR: CLEAR — SIGNIFICANT CHANGE UP
APTT BLD: 33.4 SEC — SIGNIFICANT CHANGE UP (ref 27–36.3)
AST SERPL-CCNC: 24 U/L — SIGNIFICANT CHANGE UP (ref 4–32)
BACTERIA # UR AUTO: ABNORMAL
BACTERIA # UR AUTO: NEGATIVE — SIGNIFICANT CHANGE UP
BASOPHILS # BLD AUTO: 0.03 K/UL — SIGNIFICANT CHANGE UP (ref 0–0.2)
BASOPHILS NFR BLD AUTO: 0.3 % — SIGNIFICANT CHANGE UP (ref 0–2)
BILIRUB SERPL-MCNC: <0.2 MG/DL — SIGNIFICANT CHANGE UP (ref 0.2–1.2)
BILIRUB UR-MCNC: NEGATIVE — SIGNIFICANT CHANGE UP
BILIRUB UR-MCNC: NEGATIVE — SIGNIFICANT CHANGE UP
BUN SERPL-MCNC: 10 MG/DL — SIGNIFICANT CHANGE UP (ref 7–23)
CALCIUM SERPL-MCNC: 9.3 MG/DL — SIGNIFICANT CHANGE UP (ref 8.4–10.5)
CHLORIDE SERPL-SCNC: 102 MMOL/L — SIGNIFICANT CHANGE UP (ref 98–107)
CO2 SERPL-SCNC: 20 MMOL/L — LOW (ref 22–31)
COLOR SPEC: COLORLESS — SIGNIFICANT CHANGE UP
COLOR SPEC: SIGNIFICANT CHANGE UP
CREAT SERPL-MCNC: 0.57 MG/DL — SIGNIFICANT CHANGE UP (ref 0.5–1.3)
DIFF PNL FLD: NEGATIVE — SIGNIFICANT CHANGE UP
DIFF PNL FLD: NEGATIVE — SIGNIFICANT CHANGE UP
EOSINOPHIL # BLD AUTO: 0.13 K/UL — SIGNIFICANT CHANGE UP (ref 0–0.5)
EOSINOPHIL NFR BLD AUTO: 1.4 % — SIGNIFICANT CHANGE UP (ref 0–6)
EPI CELLS # UR: 12 /HPF — HIGH (ref 0–5)
EPI CELLS # UR: 3 /HPF — SIGNIFICANT CHANGE UP (ref 0–5)
GLUCOSE SERPL-MCNC: 71 MG/DL — SIGNIFICANT CHANGE UP (ref 70–99)
GLUCOSE UR QL: NEGATIVE — SIGNIFICANT CHANGE UP
GLUCOSE UR QL: NEGATIVE — SIGNIFICANT CHANGE UP
HCT VFR BLD CALC: 33.7 % — LOW (ref 34.5–45)
HGB BLD-MCNC: 11 G/DL — LOW (ref 11.5–15.5)
HYALINE CASTS # UR AUTO: 0 /LPF — SIGNIFICANT CHANGE UP (ref 0–7)
HYALINE CASTS # UR AUTO: 0 /LPF — SIGNIFICANT CHANGE UP (ref 0–7)
IANC: 6.11 K/UL — SIGNIFICANT CHANGE UP (ref 1.5–8.5)
IMM GRANULOCYTES NFR BLD AUTO: 1.7 % — HIGH (ref 0–1.5)
INR BLD: 0.89 RATIO — SIGNIFICANT CHANGE UP (ref 0.88–1.16)
KETONES UR-MCNC: ABNORMAL
KETONES UR-MCNC: NEGATIVE — SIGNIFICANT CHANGE UP
LEUKOCYTE ESTERASE UR-ACNC: ABNORMAL
LEUKOCYTE ESTERASE UR-ACNC: ABNORMAL
LIDOCAIN IGE QN: 27 U/L — SIGNIFICANT CHANGE UP (ref 7–60)
LYMPHOCYTES # BLD AUTO: 1.91 K/UL — SIGNIFICANT CHANGE UP (ref 1–3.3)
LYMPHOCYTES # BLD AUTO: 21.3 % — SIGNIFICANT CHANGE UP (ref 13–44)
MCHC RBC-ENTMCNC: 29.6 PG — SIGNIFICANT CHANGE UP (ref 27–34)
MCHC RBC-ENTMCNC: 32.6 GM/DL — SIGNIFICANT CHANGE UP (ref 32–36)
MCV RBC AUTO: 90.8 FL — SIGNIFICANT CHANGE UP (ref 80–100)
MONOCYTES # BLD AUTO: 0.64 K/UL — SIGNIFICANT CHANGE UP (ref 0–0.9)
MONOCYTES NFR BLD AUTO: 7.1 % — SIGNIFICANT CHANGE UP (ref 2–14)
NEUTROPHILS # BLD AUTO: 6.11 K/UL — SIGNIFICANT CHANGE UP (ref 1.8–7.4)
NEUTROPHILS NFR BLD AUTO: 68.2 % — SIGNIFICANT CHANGE UP (ref 43–77)
NITRITE UR-MCNC: NEGATIVE — SIGNIFICANT CHANGE UP
NITRITE UR-MCNC: NEGATIVE — SIGNIFICANT CHANGE UP
NRBC # BLD: 0 /100 WBCS — SIGNIFICANT CHANGE UP
NRBC # FLD: 0.02 K/UL — HIGH
PH UR: 6 — SIGNIFICANT CHANGE UP (ref 5–8)
PH UR: 6.5 — SIGNIFICANT CHANGE UP (ref 5–8)
PLATELET # BLD AUTO: 276 K/UL — SIGNIFICANT CHANGE UP (ref 150–400)
POTASSIUM SERPL-MCNC: 3.7 MMOL/L — SIGNIFICANT CHANGE UP (ref 3.5–5.3)
POTASSIUM SERPL-SCNC: 3.7 MMOL/L — SIGNIFICANT CHANGE UP (ref 3.5–5.3)
PROT SERPL-MCNC: 6.9 G/DL — SIGNIFICANT CHANGE UP (ref 6–8.3)
PROT UR-MCNC: ABNORMAL
PROT UR-MCNC: NEGATIVE — SIGNIFICANT CHANGE UP
PROTHROM AB SERPL-ACNC: 10.2 SEC — LOW (ref 10.6–13.6)
RBC # BLD: 3.71 M/UL — LOW (ref 3.8–5.2)
RBC # FLD: 17.2 % — HIGH (ref 10.3–14.5)
RBC CASTS # UR COMP ASSIST: 1 /HPF — SIGNIFICANT CHANGE UP (ref 0–4)
RBC CASTS # UR COMP ASSIST: 1 /HPF — SIGNIFICANT CHANGE UP (ref 0–4)
SODIUM SERPL-SCNC: 136 MMOL/L — SIGNIFICANT CHANGE UP (ref 135–145)
SP GR SPEC: 1.01 — SIGNIFICANT CHANGE UP (ref 1–1.05)
SP GR SPEC: 1.01 — SIGNIFICANT CHANGE UP (ref 1–1.05)
UROBILINOGEN FLD QL: SIGNIFICANT CHANGE UP
UROBILINOGEN FLD QL: SIGNIFICANT CHANGE UP
WBC # BLD: 8.97 K/UL — SIGNIFICANT CHANGE UP (ref 3.8–10.5)
WBC # FLD AUTO: 8.97 K/UL — SIGNIFICANT CHANGE UP (ref 3.8–10.5)
WBC UR QL: 2 /HPF — SIGNIFICANT CHANGE UP (ref 0–5)
WBC UR QL: 24 /HPF — HIGH (ref 0–5)

## 2021-10-17 PROCEDURE — 71275 CT ANGIOGRAPHY CHEST: CPT | Mod: 26

## 2021-10-17 PROCEDURE — 99285 EMERGENCY DEPT VISIT HI MDM: CPT | Mod: 25

## 2021-10-17 PROCEDURE — 76815 OB US LIMITED FETUS(S): CPT | Mod: 26

## 2021-10-17 PROCEDURE — 93010 ELECTROCARDIOGRAM REPORT: CPT

## 2021-10-17 PROCEDURE — 70450 CT HEAD/BRAIN W/O DYE: CPT | Mod: 26

## 2021-10-17 RX ORDER — ACETAMINOPHEN 500 MG
650 TABLET ORAL ONCE
Refills: 0 | Status: COMPLETED | OUTPATIENT
Start: 2021-10-17 | End: 2021-10-17

## 2021-10-17 NOTE — ED PROVIDER NOTE - CARE PLAN
1 Principal Discharge DX:	Seizure   Principal Discharge DX:	Syncope  Secondary Diagnosis:	22 weeks gestation of pregnancy  Secondary Diagnosis:	Pulmonary embolism  Secondary Diagnosis:	Witnessed seizure-like activity

## 2021-10-17 NOTE — ED PROVIDER NOTE - ATTENDING CONTRIBUTION TO CARE
41F 5 mos pregnant p/w SOB and dizziness x 1 month.  Went to OSH and found to have Subseg PE, discharged on lovenox, initially felt better but today felt more dizzy and passed out, had stiffening x 1 mins.  No B/B incontinence- pt later changed this upon further questioning - had bladder incontinence  FS was 70.  Kathy PO.  Satting OK, some pain on deep breath.  Plan would check CTA, concern for anticoagulant failure.  EKG SR at 79 no riya no std no twi.  qtc 447.  Given bladder incontinence during LOC would check CTH and get neuro involved.  If CTA neg would watch overnight, consider cards eval for cause of syncope.  VS:  unremarkable    GEN - NAD;  malaise;   A+O x3   HEAD - NC/AT     ENT - PEERL, EOMI, mucous membranes    moist , no discharge      NECK: Neck supple, non-tender without lymphadenopathy, no masses, no JVD  PULM - CTA b/l,  symmetric breath sounds  COR -  normal heart sounds    ABD - , gravid uterus, NT, soft,  BACK - no CVA tenderness, nontender spine     EXTREMS - no edema, no deformity, warm and well perfused    SKIN - no rash    or bruising      NEUROLOGIC - alert, face symmetric, speech fluent, sensation nl, motor no focal deficit.

## 2021-10-17 NOTE — ED PROVIDER NOTE - PROGRESS NOTE DETAILS
Gloria JOHNSON PGY-2: neuro paged awaiting call back Gloria JOHNSON PGY-2: Upon quesetioning patient admits to urinary incontinence. C/f seizures - neuro paged awaiting call back. Hair, PGY3 - received pt as sign-out at ~19:00. 5.5 wk pregnant pt with recently diagnosed PE on Lovenox p/w syncopal episode. Was pending CTPA, CTH, neuro reval. CTPA negative for PE, CTH negative, seen by neuro pending recs. Pt seen by OB, states as pt has no OB complaints and normal FHR, clear from their standpoint & does not require NST. Hair, PGY3 - D/w neuro, would like to admit for vEEG. D/w OB, will follow along but would but take to their service as technically non-viable pregnancy at this point Hair, PGY3 - D/w neuro, would like to admit for vEEG. D/w OB, will follow along but would but take to their service as technically non-viable [ DD ED ATTG: ed:  not yet viable] pregnancy at this point [given stated EGA of 20wks]

## 2021-10-17 NOTE — CONSULT NOTE ADULT - SUBJECTIVE AND OBJECTIVE BOX
Neurology Consult    JESSICA WEST  41y Female  MRN-[id number]      HPI:  41 y.o. RH W  (5.5 months pregnant), recent diagnosis of PE (on lovenox) presents to [facility] ED on 10/17/21 following a witnessed episode of syncope and seizure like activity, for which Neurology was consulted. Patient reports she was sitting in her car in the driveway of her home at the time, when she suddenly felt dizzy and lost consciousness. Patient reports the episode lasted a duration of 2 minutes, during which her body was stiff, as described by family who witnessed the event. She reports associated urinary incontinence during the event, however no tongue bite. Patient has never had a seizure before and does not have a family history  of seizures. She reports recurrent episodes of dizziness for approximately 1 month, initially infrequent. However, last weekend, she experienced multiple episodes of dizziness while driving, causing her to present to an OSH. CTA was done 10/10 and was reported to show a R subsegmental PE for which she was initiated on       A 10-system ROS was performed and is negative except as noted above and/or in the HPI.      PAST MEDICAL & SURGICAL HISTORY:  No pertinent past medical history    Pulmonary embolism    No significant past surgical history        FAMILY HISTORY:   FAMILY HISTORY:  No pertinent family history in first degree relatives        SOCIAL HISTORY:       MEDICATIONS    Home Medications:  acetaminophen 325 mg oral tablet: 3 tab(s) orally every 6 hours (2018 00:45)  ferrous sulfate 325 mg (65 mg elemental iron) oral tablet: 1 tab(s) orally 2 times a day (2018 00:45)    MEDICATIONS  (STANDING):    MEDICATIONS  (PRN):      ALLERGIES  Allergies    penicillin (Rash)    Intolerances        VITALS & EXAMINATION    Height (cm): 154.9 (10- @ 17:04)    Vital Signs Last 24 Hrs  T(C): 36.6 (17 Oct 2021 20:09), Max: 36.6 (17 Oct 2021 17:04)  T(F): 97.8 (17 Oct 2021 20:09), Max: 97.9 (17 Oct 2021 17:04)  HR: 75 (17 Oct 2021 20:09) (72 - 83)  BP: 103/66 (17 Oct 2021 20:09) (103/66 - 119/75)  BP(mean): --  RR: 20 (17 Oct 2021 20:09) (16 - 20)  SpO2: 100% (17 Oct 2021 20:09) (100% - 100%)    General Exam:  Constitutional: NAD, pleasant, cooperative  Head: NT/AT   Chest: No signs of resp distress, on room air  Abd: Soft, NTTP  Extremities: No edema  	    Neuro Exam:  Mental Status: Patient is alert, awake, oriented to person, place, situation and time.  Language is fluent with good comprehension.                            Cranial Nerves:    II: Pupils equal and reactive, no VF deficits, normal fundus  III, IV, VI: EOM intact, no gaze preference or deviation, no nystagmus.  V: normal sensation in V1, V2, and V3 segments bilaterally  VII: no asymmetry, no nasolabial fold flattening  VIII: hearing intact to finger rub bilaterally  IX, X: normal palatal elevation, no uvular deviation  XI: 5/5 head turn and 5/5 shoulder shrug bilaterally  XII: midline tongue protrusion    Motor:  Muscle bulk and tone are normal. Strength is 5/5 in all four extremities both proximally and distally. Intact fine motor movements bilaterally. There is no pronator drift.     Sensation is intact to light touch, pinprick, vibration, and proprioception throughout. Romberg is negative.    Reflexes:  2+ and symmetric at the biceps, triceps, brachioradialis, patella, and Achilles bilaterally. Plantar response is flexor bilaterally. No clonus.  Reflexes:              Biceps(C5)       BR(C6)     Triceps(C7)        Patellar(L4)    Achilles(S1)    Plantar Resp  R	     2	                      2	             2		                   2		      2		      Down   L	     2	                      2	             2		                   2		      2		      Down     Coordination: No dysmetria on finger-nose-finger or heel-knee-shin. Normal rapid alternating movements. Fast finger tapping with normal amplitude and speed.    Gait: Narrow based with normal stride length and good arm swing bilaterally. Able to walk on heels, toes, and in tandem.      LABS:    CBC                       11.0   8.97  )-----------( 276      ( 17 Oct 2021 19:05 )             33.7     Chem 10-17    136  |  102  |  10  ----------------------------<  71  3.7   |  20<L>  |  0.57    Ca    9.3      17 Oct 2021 19:05    TPro  6.9  /  Alb  4.0  /  TBili  <0.2  /  DBili  x   /  AST  24  /  ALT  33  /  AlkPhos  68  10-17    Coags PT/INR - ( 17 Oct 2021 19:20 )   PT: 10.2 sec;   INR: 0.89 ratio         PTT - ( 17 Oct 2021 19:20 )  PTT:33.4 sec  LFTs LIVER FUNCTIONS - ( 17 Oct 2021 19:05 )  Alb: 4.0 g/dL / Pro: 6.9 g/dL / ALK PHOS: 68 U/L / ALT: 33 U/L / AST: 24 U/L / GGT: x           Lipid Panel   A1c   Cardiac enzymes     U/A Urinalysis Basic - ( 17 Oct 2021 19:05 )    Color: Light Yellow / Appearance: Slightly Turbid / S.008 / pH: x  Gluc: x / Ketone: Negative  / Bili: Negative / Urobili: <2 mg/dL   Blood: x / Protein: Trace / Nitrite: Negative   Leuk Esterase: Large / RBC: 1 /HPF / WBC 24 /HPF   Sq Epi: x / Non Sq Epi: 12 /HPF / Bacteria: Moderate        STUDIES & IMAGING                         Neurology Consult    JESSICA WEST  41y Female  MRN-[id number]    **INCOMPLETE**    HPI:  41 y.o. RH W  (5.5 months pregnant), recent diagnosis of PE (on lovenox) presents to [facility] ED on 10/17/21 following a witnessed episode of syncope and seizure like activity, for which Neurology was consulted. Patient reports she was sitting in her car in the driveway of her home at the time, when she suddenly felt dizzy and lost consciousness. Patient reports the episode lasted a duration of 2 minutes, during which her body was stiff, as described by family who witnessed the event. She reports associated urinary incontinence during the event, however no tongue bite. Patient has never had a seizure before and does not have a family history  of seizures. She reports recurrent episodes of dizziness for approximately 1 month, initially infrequent. However, last weekend, she experienced multiple episodes of dizziness while driving, causing her to present to an OSH. CTA was done 10/10 and was reported to show a R subsegmental PE for which she was initiated on       A 10-system ROS was performed and is negative except as noted above and/or in the HPI.      PAST MEDICAL & SURGICAL HISTORY:  No pertinent past medical history    Pulmonary embolism    No significant past surgical history        FAMILY HISTORY:   FAMILY HISTORY:  No pertinent family history in first degree relatives        SOCIAL HISTORY:       MEDICATIONS    Home Medications:  acetaminophen 325 mg oral tablet: 3 tab(s) orally every 6 hours (2018 00:45)  ferrous sulfate 325 mg (65 mg elemental iron) oral tablet: 1 tab(s) orally 2 times a day (2018 00:45)    MEDICATIONS  (STANDING):    MEDICATIONS  (PRN):      ALLERGIES  Allergies    penicillin (Rash)    Intolerances        VITALS & EXAMINATION    Height (cm): 154.9 (10- @ 17:04)    Vital Signs Last 24 Hrs  T(C): 36.6 (17 Oct 2021 20:09), Max: 36.6 (17 Oct 2021 17:04)  T(F): 97.8 (17 Oct 2021 20:09), Max: 97.9 (17 Oct 2021 17:04)  HR: 75 (17 Oct 2021 20:09) (72 - 83)  BP: 103/66 (17 Oct 2021 20:09) (103/66 - 119/75)  BP(mean): --  RR: 20 (17 Oct 2021 20:09) (16 - 20)  SpO2: 100% (17 Oct 2021 20:09) (100% - 100%)      **INCOMPLETE**      LABS:    CBC                       11.0   8.97  )-----------( 276      ( 17 Oct 2021 19:05 )             33.7     Chem 10-17    136  |  102  |  10  ----------------------------<  71  3.7   |  20<L>  |  0.57    Ca    9.3      17 Oct 2021 19:05    TPro  6.9  /  Alb  4.0  /  TBili  <0.2  /  DBili  x   /  AST  24  /  ALT  33  /  AlkPhos  68  10-    Coags PT/INR - ( 17 Oct 2021 19:20 )   PT: 10.2 sec;   INR: 0.89 ratio         PTT - ( 17 Oct 2021 19:20 )  PTT:33.4 sec  LFTs LIVER FUNCTIONS - ( 17 Oct 2021 19:05 )  Alb: 4.0 g/dL / Pro: 6.9 g/dL / ALK PHOS: 68 U/L / ALT: 33 U/L / AST: 24 U/L / GGT: x           Lipid Panel   A1c   Cardiac enzymes     U/A Urinalysis Basic - ( 17 Oct 2021 19:05 )  Color: Light Yellow / Appearance: Slightly Turbid / S.008 / pH: x  Gluc: x / Ketone: Negative  / Bili: Negative / Urobili: <2 mg/dL   Blood: x / Protein: Trace / Nitrite: Negative   Leuk Esterase: Large / RBC: 1 /HPF / WBC 24 /HPF   Sq Epi: x / Non Sq Epi: 12 /HPF / Bacteria: Moderate        STUDIES & IMAGING    **INCOMPLETE**                     Neurology Consult    JESSICA WEST  41y Female  MRN-[id number]      HPI:  41 y.o. RH W  (5.5 months pregnant), recent diagnosis of PE (on lovenox) presents to [facility] ED on 10/17/21 following a witnessed episode of syncope and seizure like activity, for which Neurology was consulted. Patient reports she was sitting in her car in the driveway of her home at the time, when she suddenly felt dizzy and lost consciousness. Patient reports the episode lasted a duration of 2 minutes, during which her body was stiff, as described by family who witnessed the event. She reports associated urinary incontinence during the event, however no tongue bite. Patient has never had a seizure before and does not have a family history of seizures. She reports recurrent episodes of dizziness for approximately 1 month, initially infrequent. However, last weekend, she experienced multiple episodes of dizziness while driving, causing her to present to an OSH. CTA was done at that time (10/10/21) and was reported to show a R subsegmental PE for which she was initiated and discharged on lovenox BID. Of note, patient reports a blood glucose level in the 60s on presentation at the OSH. She reports recurrent episodes of dizziness since discharge, notes they are often preceded by episodes of sob (described as inability to take deep breaths), and accompanied by blurry vision. She does not report any associated headache, nausea, vomiting, fever, chills, or recent illness. She notes her FSG was on the lower end (70s) on presentation to the ED here as well, despite drinking some juice PTA.       A 10-system ROS was performed and is negative except as noted above and/or in the HPI.      PAST MEDICAL & SURGICAL HISTORY:    Pulmonary embolism    No significant past surgical history      FAMILY HISTORY:  No pertinent family history in first degree relatives        SOCIAL HISTORY:       MEDICATIONS  Home Medications:  acetaminophen 325 mg oral tablet: 3 tab(s) orally every 6 hours (2018 00:45)  ferrous sulfate 325 mg (65 mg elemental iron) oral tablet: 1 tab(s) orally 2 times a day (2018 00:45)    MEDICATIONS  (STANDING):    MEDICATIONS  (PRN):      ALLERGIES  penicillin (Rash)      VITALS & EXAMINATION    Height (cm): 154.9 (10-17 @ 17:04)    Vital Signs Last 24 Hrs  T(C): 36.6 (17 Oct 2021 20:09), Max: 36.6 (17 Oct 2021 17:04)  T(F): 97.8 (17 Oct 2021 20:09), Max: 97.9 (17 Oct 2021 17:04)  HR: 75 (17 Oct 2021 20:09) (72 - 83)  BP: 103/66 (17 Oct 2021 20:09) (103/66 - 119/75)  RR: 20 (17 Oct 2021 20:09) (16 - 20)  SpO2: 100% (17 Oct 2021 20:09) (100% - 100%)    General:  Constitutional: appears stated age, in no apparent distress including pain  Head: Normocephalic & atraumatic.    Respiratory: Patent airway. All lung fields are clear to auscultation bilaterally.  Extremities: No cyanosis, clubbing, or edema.  Skin: No rashes, bruising, or discoloration.    Neurological (>12):  MS: Awake, alert, oriented to person, place, situation, time. Normal affect. Follows all commands.    Language: Speech is clear, fluent with good repetition & comprehension.    CNs: PERRL (R = 3mm, L = 3mm). VFF to confrontation. EOMI no nystagmus, no diplopia. V1-3 intact to LT. No facial asymmetry b/l, full eye closure strength b/l. Hearing grossly normal (rubbing fingers) b/l. Symmetric palate elevation in midline. Gag reflex deferred. Tongue midline, normal movements, no atrophy.    Motor: Normal muscle bulk & tone. No noticeable tremor or seizure. No pronator drift. Fine finger movements intact.               Deltoid	Biceps	Triceps	Wrist	   R	5	5	5	5	5 	  L	5	5	5	5	5    	H-Flex	K-Flex	K-Ext	D-Flex	P-Flex  R	5	5	5	5	5 	   L	5	5	5	5	5	     Sensation: Intact to LT/PP b/l throughout.     Cortical: Extinction on DSS (neglect): none    Reflexes:              Biceps(C5)       BR(C6)     Triceps(C7)               Patellar(L4)    Achilles(S1)    Plantar Resp  R	2	          2	             2		        2		    2		Down   L	2	          2	             2		        2		    2		Down     Coordination: No dysmetria to FTN/HTS b/l.     Gait: Deferred.       LABS:    CBC                       11.0   8.97  )-----------( 276      ( 17 Oct 2021 19:05 )             33.7     Chem 10-17    136  |  102  |  10  ----------------------------<  71  3.7   |  20<L>  |  0.57    Ca    9.3      17 Oct 2021 19:05    LFTs LIVER FUNCTIONS - ( 17 Oct 2021 19:05 )  TPro  6.9  /  Alb  4.0  /  TBili  <0.2  /  DBili  x   /  AST  24  /  ALT  33  /  AlkPhos  68  10-17    Coags PT/INR - ( 17 Oct 2021 19:20 )   PT: 10.2 sec;   INR: 0.89 ratio    PTT - ( 17 Oct 2021 19:20 )  PTT:33.4 sec       Lipid Panel   A1c   Cardiac enzymes     U/A Urinalysis Basic - ( 17 Oct 2021 19:05 )  Color: Light Yellow / Appearance: Slightly Turbid / S.008 / pH: x  Gluc: x / Ketone: Negative  / Bili: Negative / Urobili: <2 mg/dL   Blood: x / Protein: Trace / Nitrite: Negative   Leuk Esterase: Large / RBC: 1 /HPF / WBC 24 /HPF   Sq Epi: x / Non Sq Epi: 12 /HPF / Bacteria: Moderate      STUDIES & IMAGING    < from: CT Head No Cont (10.17.21 @ 22:38) >  IMPRESSION:    No acute intracranial hemorrhage, mass effect, or evidence of acute vascular territorial infarction. If clinical symptoms persist or worsen, more sensitive evaluation with brain MRI may be obtained, if no contraindications exist.  < end of copied text >      < from: CT Angio Chest PE Protocol w/ IV Cont (10.17.21 @ 22:39) >  FINDINGS:    LUNGS AND AIRWAYS: Patent central airways.  Bibasilar subsegmental atelectasis.  PLEURA: No pleural effusion.  MEDIASTINUM AND DEMETRIUS: No lymphadenopathy.  VESSELS: No pulmonary embolism within main, left main, right main, lobar, or segmental pulmonary arteries. Evaluation of the subsegmental arteries is limited by suboptimal contrast opacification. Normal caliber thoracic aorta.  HEART: Heart size is normal. No pericardial effusion.  CHEST WALL AND LOWER NECK: Withinnormal limits.  VISUALIZED UPPER ABDOMEN: Within normal limits.  BONES: Mild degenerative changes of the spine.    IMPRESSION:  No pulmonary embolism.  < end of copied text >

## 2021-10-17 NOTE — ED PROVIDER NOTE - CLINICAL SUMMARY MEDICAL DECISION MAKING FREE TEXT BOX
Gloria JOHNSON PGY-2: 42 yo F , 5.5 months pregnant with recently diagnosed subsegmental PE on lovenox, presenting with SOB, dizziness, and syncope. . Symptoms most likely from worsening clot burden or recurrent PE. Will obtain repeat CTA to evaluate. CBC to evaluate for anemia, CMP for electrolyte disturbance as etiology of syncope. Labs, imaging, reassess.

## 2021-10-17 NOTE — ED ADULT NURSE REASSESSMENT NOTE - NS ED NURSE REASSESS COMMENT FT1
Pt A&Ox4, resting in stretcher. Pt denies cp, sob, abd pain, ha, dizziness, n/v/d, fevers/chills at this time. Respirations even and unlabored, no accessory muscle use. Pt awaiting CT scan. Stretcher in lowest position, side rails up, call bell within reach.

## 2021-10-17 NOTE — ED ADULT NURSE NOTE - CHIEF COMPLAINT QUOTE
Pt s/p syncopal episode  in the driveway at her home as per daughter.  Pt is 5 months pregnant recently dx with PE and on lovenox.  EMS stated she had a blank steer.  on scene.  Denies chest pain, sob, dizziness, cough, chills.  Pt with hx of hypoglycemia in pregnancy.  [facility]  Apple juice given to pt.  Pt consumed bread at about 12

## 2021-10-17 NOTE — CONSULT NOTE ADULT - ASSESSMENT
**INCOMPLETE** **INCOMPLETE**    41 y.o. RH W  (5.5 months pregnant), recent diagnosis of PE (on lovenox) presents to [facility] ED on 10/17/21 following a witnessed episode of syncope and seizure like activity, described as stiffening of the body for a duration of 2 minutes, with associated urinary incontinence, for which Neurology was consulted. VSS on presentation. Exam without significant findings. Labs with FSG 70 at triage, otherwise wnl. CTH negative for acute pathology and CTA chest PE protocol, although noted to provide poor evaluation of subsegmental arteries, was negative for PE.     **INCOMPLETE**    Recommendations:   Labs/Studies:  [] Routine EEG or Video EEG   [] Basic labs, UA, Ucx to r/o possible underlying infectious/metabolic etiology  [] MRI brain w/o contrast (in pregnant patient)    Other:   [] Telemetry monitoring  [] Neurochecks/VS Q4H  [] Seizure and aspiration precautions   [] Patient advised to avoid driving, bathing alone, climbing to high places and operating heavy machinery given risk of seizures.   [] Patient can follow up with general neurology at [address] [place] [address] 1-2 weeks after discharge. Please instruct the patient to call [phone] to schedule this appointment.     *Please note: if patient has a convulsion, please document length of episode, specifically what patient was doing paying attention to eye opening vs closure, gaze deviation, shaking of extremities, tongue bite, urinary incontinence, any derangement of vital signs. *    Case to be seen and discussed with General Neurology attending, Dr. Reyna.  **INCOMPLETE**    41 y.o. RH W  (5.5 months pregnant), recent diagnosis of PE (on lovenox) presents to [facility] ED on 10/17/21 following a witnessed episode of syncope and seizure like activity, described as stiffening of the body for a duration of 2 minutes, with associated urinary incontinence, for which Neurology was consulted. VSS on presentation. Exam without significant findings. Labs with FSG 70 at triage, otherwise wnl. CTH negative for acute pathology and CTA chest PE protocol, although noted to provide poor evaluation of subsegmental arteries, was negative for PE.     **INCOMPLETE**    Recommendations:   Labs/Studies:  [] Routine EEG or Video EEG   [] Basic labs, UA, Ucx to r/o possible underlying infectious/metabolic etiology  [] MRI brain w/o contrast (in pregnant patient, to be discussed with attending)    Other:   [] Telemetry monitoring  [] Neurochecks/VS Q4H  [] Seizure and aspiration precautions   [] Patient advised to avoid driving, bathing alone, climbing to high places and operating heavy machinery given risk of seizures.   [] Patient can follow up with general neurology at [address] [place] [address] 1-2 weeks after discharge. Please instruct the patient to call [phone] to schedule this appointment.     *Please note: if patient has a convulsion, please document length of episode, specifically what patient was doing paying attention to eye opening vs closure, gaze deviation, shaking of extremities, tongue bite, urinary incontinence, any derangement of vital signs. *    Case to be seen and discussed with General Neurology attending, Dr. Reyna.  41 y.o. RH W  (5.5 months pregnant), recent diagnosis of PE (on lovenox) presents to [facility] ED on 10/17/21 following a witnessed episode of syncope and seizure like activity, described as stiffening of the body for a duration of 2 minutes, with associated urinary incontinence, for which Neurology was consulted. VSS on presentation. Exam without significant findings. Labs with FSG 70 at triage, otherwise wnl. CTH negative for acute pathology and CTA chest PE protocol, although noted to provide poor evaluation of subsegmental arteries, was negative for PE.     Impression: Transient LOC with stiffening of the body and urinary incontinence c/f acute symptomatic seizure possibly due to metabolic disturbance (eg, hypoglycemia) in setting physiological state known to possibly reduce seizure threshold (pregnancy), vs in setting of medical acute medical illness (known PE) vs possible underlying infectious etiology, vs syncope. Less c/f acute cerebrovascular event.        Recommendations:   Labs/Studies:  [] Routine EEG or Video EEG   [] Basic labs, UA, Ucx to r/o possible underlying infectious/metabolic etiology  [] MRI brain w/o contrast (in pregnant patient, to be discussed with attending)  [] EKG    Other:   [] Telemetry monitoring  [] Neurochecks/VS Q4H  [] Seizure and aspiration precautions   [] Patient advised to avoid driving, bathing alone, climbing to high places and operating heavy machinery given risk of seizures.   [] Patient can follow up with general neurology at [address] [place] [address] 1-2 weeks after discharge. Please instruct the patient to call [phone] to schedule this appointment.     *Please note: if patient has a convulsion, please document length of episode, specifically what patient was doing paying attention to eye opening vs closure, gaze deviation, shaking of extremities, tongue bite, urinary incontinence, any derangement of vital signs. *    Case to be seen and discussed with General Neurology attending, Dr. Reyna.

## 2021-10-17 NOTE — CONSULT NOTE ADULT - SUBJECTIVE AND OBJECTIVE BOX
JESSICA WEST  41y  Female [id number]    HPI:        Name of GYN Physician:     POB:      Pgyn: Denies fibroids, cysts, endometriosis, STI's, Abnormal pap smears     Home meds:     Hospital Meds:   MEDICATIONS  (STANDING):    MEDICATIONS  (PRN):      Allergies    penicillin (Rash)    Intolerances        PAST MEDICAL & SURGICAL HISTORY:  No pertinent past medical history    Pulmonary embolism    No significant past surgical history        FAMILY HISTORY:  No pertinent family history in first degree relatives        Social History:  Denies smoking use, drug use, alcohol use.   +occasional social alcohol use    Vital Signs Last 24 Hrs  T(C): 36.6 (17 Oct 2021 21:33), Max: 36.6 (17 Oct 2021 17:04)  T(F): 97.9 (17 Oct 2021 21:33), Max: 97.9 (17 Oct 2021 17:04)  HR: 85 (17 Oct 2021 21:33) (72 - 85)  BP: 115/77 (17 Oct 2021 21:33) (103/66 - 119/75)  BP(mean): --  RR: 18 (17 Oct 2021 21:33) (16 - 20)  SpO2: 100% (17 Oct 2021 21:33) (100% - 100%)    Physical Exam:   General: sitting comfortably in bed, NAD   CV: RR S1S2 no m/r/g  Lungs: CTA b/l, good air flow b/l   Back: No CVA tenderness  Abd: Soft, non-tender, non-distended.  Bowel sounds present.    :  No bleeding on pad.    External labia wnl.  Bimanual exam with no cervical motion tenderness, uterus wnl, adnexa non palpable b/l.  Cervix closed vs. Cervix dilated    cm   Speculum Exam: No active bleeding from os.  Physiologic discharge.    Ext: non-tender b/l, no edema     LABS:                              11.0   8.97  )-----------( 276      ( 17 Oct 2021 19:05 )             33.7     10-17    136  |  102  |  10  ----------------------------<  71  3.7   |  20<L>  |  0.57    Ca    9.3      17 Oct 2021 19:05    TPro  6.9  /  Alb  4.0  /  TBili  <0.2  /  DBili  x   /  AST  24  /  ALT  33  /  AlkPhos  68  10-17    I&O's Detail    PT/INR - ( 17 Oct 2021 19:20 )   PT: 10.2 sec;   INR: 0.89 ratio         PTT - ( 17 Oct 2021 19:20 )  PTT:33.4 sec  Urinalysis Basic - ( 17 Oct 2021 22:12 )    Color: Colorless / Appearance: Clear / S.009 / pH: x  Gluc: x / Ketone: Trace  / Bili: Negative / Urobili: <2 mg/dL   Blood: x / Protein: Negative / Nitrite: Negative   Leuk Esterase: Small / RBC: 1 /HPF / WBC 2 /HPF   Sq Epi: x / Non Sq Epi: 3 /HPF / Bacteria: Negative        RADIOLOGY & ADDITIONAL STUDIES: JESSICA WEST  41y  Female [id number]    HPI: 42yo  @22+5 presents to the ED after syncopal episode. Patient states that she started feeling lightheaded while driving earlier today, and pulled over onto the side of the road. Once the car was stopped, patient reports that she lost consciousness. When she awoke, she had lost urinary continence and felt weak. She remembers all events leading up to her passing out. OB consulted for evaluation of fetal status. Patient denies OB complaints: denies vaginal bleeding, abdominal pain/cramping, abdominal trauma, leakage of fluid. She endorses good fetal movement. Patient has recently diagnosed PE, and is taking Lovenox BID (patient unsure of dose, but states it is either 70 or 80mg BID). Patient denies fevers, chills, chest pain, SOB, lightheadedness, dizziness, n/v.    Name of GYN Physician: [facility]    POB:    x2 (, )  C/S 2018  ETOP , no d&c  SAB     Pgyn: +fibroids.  Denies cysts, endometriosis, STI's, Abnormal pap smears     Home meds:   Lovenox     Allergies  penicillin (Rash)    PAST MEDICAL & SURGICAL HISTORY:  Pulmonary embolism  s/p C/S    Social History:  Denies smoking use, drug use, alcohol use.       Vital Signs Last 24 Hrs  T(C): 36.6 (17 Oct 2021 21:33), Max: 36.6 (17 Oct 2021 17:04)  T(F): 97.9 (17 Oct 2021 21:33), Max: 97.9 (17 Oct 2021 17:04)  HR: 85 (17 Oct 2021 21:33) (72 - 85)  BP: 115/77 (17 Oct 2021 21:33) (103/66 - 119/75)  RR: 18 (17 Oct 2021 21:33) (16 - 20)  SpO2: 100% (17 Oct 2021 21:33) (100% - 100%)    Physical Exam:   General: sitting comfortably in bed, NAD   CV: RR S1S2 no m/r/g  Lungs: CTA b/l, good air flow b/l   Abd: Soft, non-tender, non-distended.   : No bleeding on pad.    Ext: non-tender b/l, no edema     LABS:             11.0   8.97  )-----------( 276      ( 17 Oct 2021 19:05 )             33.7     10    136  |  102  |  10  ----------------------------<  71  3.7   |  20<L>  |  0.57    Ca    9.3      17 Oct 2021 19:05    TPro  6.9  /  Alb  4.0  /  TBili  <0.2  /  DBili  x   /  AST  24  /  ALT  33  /  AlkPhos  68  10    I&O's Detail    PT/INR - ( 17 Oct 2021 19:20 )   PT: 10.2 sec;   INR: 0.89 ratio         PTT - ( 17 Oct 2021 19:20 )  PTT:33.4 sec  Urinalysis Basic - ( 17 Oct 2021 22:12 )    Color: Colorless / Appearance: Clear / S.009 / pH: x  Gluc: x / Ketone: Trace  / Bili: Negative / Urobili: <2 mg/dL   Blood: x / Protein: Negative / Nitrite: Negative   Leuk Esterase: Small / RBC: 1 /HPF / WBC 2 /HPF   Sq Epi: x / Non Sq Epi: 3 /HPF / Bacteria: Negative    RADIOLOGY & ADDITIONAL STUDIES:  Indication: to evaluate fetal well being.  Uterus scanned in two planes in gray scale and m mode.  Fetal heart rate measured  (  124   bpm).  Fetal movement noted.  No free pelvic fluid noted.  No subchorionic hematoma seen.    Impression: live IUP.  Fibroid uterus.

## 2021-10-17 NOTE — CONSULT NOTE ADULT - ASSESSMENT
42yo  @22+5 presents to the ED after syncopal episode. No OB complaints. +FHR documented on bedside ED ultrasound.  42yo  @22+5 presents to the ED after syncopal episode. No OB complaints. +FHR documented on bedside ED ultrasound.     40y/o  with prenatal care at [facility] presents at 22 5/7 weeks after syncopal episode. +FHR documented on bedside ED ultrasound. To have w/u for syncope by ED Department and f/u for Ob care as an outpatient.  Jose Daniel Portillo M.D.

## 2021-10-17 NOTE — ED PROVIDER NOTE - OBJECTIVE STATEMENT
40 yo F , 5.5 months pregnant recently diagnosed with PE presenting with syncope. Patient states that since September, she has been having intermittent episodes of lightheadedness preceded by SOB which occur sometimes at rest. For the past 2 weeks she has noted an increase in frequency of these dizziness/SOB episodes so went to OSH where she had CTA done on 10/10, which showed a R subsegmental PE. Patient was admitted for anticoagulation and discharged on lovenox, to which patient endorses copmliance. Earlier today she felt the SOB and dizziness come back while driving, she parked and then had a witnessed syncopal episode. Per daughter and , she ahd stiffening last for around ~2 mins, no incontinence, no tongue biting, no siezure like activity.  at the time. No chest pain, nausea/vomiting, fever or chills. 42 yo F , 5.5 months pregnant recently diagnosed with PE presenting with syncope. Patient states that since September, she has been having intermittent episodes of lightheadedness preceded by SOB which occur sometimes at rest. For the past 2 weeks she has noted an increase in frequency of these dizziness/SOB episodes so went to OSH where she had CTA done on 10/10, which showed a R subsegmental PE. Patient was admitted for anticoagulation and discharged on lovenox, to which patient endorses copmliance. Earlier today she felt the SOB and dizziness come back while driving, she parked and then had a witnessed syncopal episode. Per daughter and , she ahd stiffening last for around ~2 mins, + urinary incontinence, no tongue biting, no siezure like activity.  at the time. No chest pain, nausea/vomiting, fever or chills.

## 2021-10-17 NOTE — ED ADULT NURSE NOTE - OBJECTIVE STATEMENT
Pt w/ hx of syncope, PE on anticoagulant injections, 5.5 months pregnant c/o syncope without fall today Pt denies chest pain SOB at this time Pt p/w NAD NSR on monitor breaths even unlabored skin warm dry intact Report given to primary RN Monique

## 2021-10-17 NOTE — CONSULT NOTE ADULT - PROBLEM SELECTOR RECOMMENDATION 9
Plan  - Fetal status: +FHR, gross fetal movement noted on US  - No acute OB interventions indicated at this time   - Syncopal workup and PE treatment per primary team    d/w Dr. Bandar Milton PGY2

## 2021-10-17 NOTE — ED ADULT TRIAGE NOTE - CHIEF COMPLAINT QUOTE
Pt s/p syncopal episode  in the driveway at her home as per daughter.  Pt is 5 months pregnant recently dx with PE and on lovenox.  EMS stated she had a blank steer.  on scene.  Denies chest pain, sob, dizziness, cough, chills Pt s/p syncopal episode  in the driveway at her home as per daughter.  Pt is 5 months pregnant recently dx with PE and on lovenox.  EMS stated she had a blank steer.  on scene.  Denies chest pain, sob, dizziness, cough, chills.  Pt with hx of hypoglycemia in pregnancy.  [facility]  Apple juice given to pt.  Pt consumed bread at about 12

## 2021-10-18 DIAGNOSIS — Z86.39 PERSONAL HISTORY OF OTHER ENDOCRINE, NUTRITIONAL AND METABOLIC DISEASE: ICD-10-CM

## 2021-10-18 DIAGNOSIS — I26.99 OTHER PULMONARY EMBOLISM WITHOUT ACUTE COR PULMONALE: ICD-10-CM

## 2021-10-18 DIAGNOSIS — R56.9 UNSPECIFIED CONVULSIONS: ICD-10-CM

## 2021-10-18 DIAGNOSIS — Z3A.22 22 WEEKS GESTATION OF PREGNANCY: ICD-10-CM

## 2021-10-18 DIAGNOSIS — Z29.9 ENCOUNTER FOR PROPHYLACTIC MEASURES, UNSPECIFIED: ICD-10-CM

## 2021-10-18 DIAGNOSIS — Z98.891 HISTORY OF UTERINE SCAR FROM PREVIOUS SURGERY: Chronic | ICD-10-CM

## 2021-10-18 DIAGNOSIS — R55 SYNCOPE AND COLLAPSE: ICD-10-CM

## 2021-10-18 LAB
A1C WITH ESTIMATED AVERAGE GLUCOSE RESULT: 4.7 % — SIGNIFICANT CHANGE UP (ref 4–5.6)
ANION GAP SERPL CALC-SCNC: 11 MMOL/L — SIGNIFICANT CHANGE UP (ref 7–14)
B-OH-BUTYR SERPL-SCNC: <0 MMOL/L — SIGNIFICANT CHANGE UP (ref 0–0.4)
BASE EXCESS BLDV CALC-SCNC: 0.5 MMOL/L — SIGNIFICANT CHANGE UP (ref -2–3)
BLOOD GAS VENOUS COMPREHENSIVE RESULT: SIGNIFICANT CHANGE UP
BUN SERPL-MCNC: 10 MG/DL — SIGNIFICANT CHANGE UP (ref 7–23)
C PEPTIDE SERPL-MCNC: 4.9 NG/ML — HIGH (ref 1.1–4.4)
CALCIUM SERPL-MCNC: 8.9 MG/DL — SIGNIFICANT CHANGE UP (ref 8.4–10.5)
CHLORIDE BLDV-SCNC: 107 MMOL/L — SIGNIFICANT CHANGE UP (ref 96–108)
CHLORIDE SERPL-SCNC: 105 MMOL/L — SIGNIFICANT CHANGE UP (ref 98–107)
CO2 BLDV-SCNC: 25.6 MMOL/L — SIGNIFICANT CHANGE UP (ref 22–26)
CO2 SERPL-SCNC: 19 MMOL/L — LOW (ref 22–31)
CREAT SERPL-MCNC: 0.68 MG/DL — SIGNIFICANT CHANGE UP (ref 0.5–1.3)
ESTIMATED AVERAGE GLUCOSE: 88 — SIGNIFICANT CHANGE UP
GAS PNL BLDV: 135 MMOL/L — LOW (ref 136–145)
GAS PNL BLDV: SIGNIFICANT CHANGE UP
GLUCOSE BLDV-MCNC: 87 MG/DL — SIGNIFICANT CHANGE UP (ref 70–99)
GLUCOSE SERPL-MCNC: 84 MG/DL — SIGNIFICANT CHANGE UP (ref 70–99)
HCO3 BLDV-SCNC: 24 MMOL/L — SIGNIFICANT CHANGE UP (ref 22–29)
HCT VFR BLD CALC: 30.9 % — LOW (ref 34.5–45)
HCT VFR BLDA CALC: 32 % — LOW (ref 34.5–46.5)
HGB BLD CALC-MCNC: 10.5 G/DL — LOW (ref 11.5–15.5)
HGB BLD-MCNC: 10.2 G/DL — LOW (ref 11.5–15.5)
INSULIN SERPL-MCNC: 16.9 UU/ML — SIGNIFICANT CHANGE UP (ref 2.6–24.9)
LACTATE BLDV-MCNC: 1 MMOL/L — SIGNIFICANT CHANGE UP (ref 0.5–2)
MAGNESIUM SERPL-MCNC: 2 MG/DL — SIGNIFICANT CHANGE UP (ref 1.6–2.6)
MCHC RBC-ENTMCNC: 29.7 PG — SIGNIFICANT CHANGE UP (ref 27–34)
MCHC RBC-ENTMCNC: 33 GM/DL — SIGNIFICANT CHANGE UP (ref 32–36)
MCV RBC AUTO: 90.1 FL — SIGNIFICANT CHANGE UP (ref 80–100)
NRBC # BLD: 0 /100 WBCS — SIGNIFICANT CHANGE UP
NRBC # FLD: 0 K/UL — SIGNIFICANT CHANGE UP
NT-PROBNP SERPL-SCNC: 103 PG/ML — SIGNIFICANT CHANGE UP
PCO2 BLDV: 36 MMHG — LOW (ref 39–42)
PH BLDV: 7.44 — HIGH (ref 7.32–7.43)
PHOSPHATE SERPL-MCNC: 3.6 MG/DL — SIGNIFICANT CHANGE UP (ref 2.5–4.5)
PLATELET # BLD AUTO: 253 K/UL — SIGNIFICANT CHANGE UP (ref 150–400)
PO2 BLDV: 68 MMHG — SIGNIFICANT CHANGE UP
POTASSIUM BLDV-SCNC: 4.1 MMOL/L — SIGNIFICANT CHANGE UP (ref 3.5–5.1)
POTASSIUM SERPL-MCNC: 4.2 MMOL/L — SIGNIFICANT CHANGE UP (ref 3.5–5.3)
POTASSIUM SERPL-SCNC: 4.2 MMOL/L — SIGNIFICANT CHANGE UP (ref 3.5–5.3)
RBC # BLD: 3.43 M/UL — LOW (ref 3.8–5.2)
RBC # FLD: 17.5 % — HIGH (ref 10.3–14.5)
SAO2 % BLDV: 94.4 % — SIGNIFICANT CHANGE UP
SARS-COV-2 RNA SPEC QL NAA+PROBE: SIGNIFICANT CHANGE UP
SODIUM SERPL-SCNC: 135 MMOL/L — SIGNIFICANT CHANGE UP (ref 135–145)
TROPONIN T, HIGH SENSITIVITY RESULT: <6 NG/L — SIGNIFICANT CHANGE UP
WBC # BLD: 7 K/UL — SIGNIFICANT CHANGE UP (ref 3.8–10.5)
WBC # FLD AUTO: 7 K/UL — SIGNIFICANT CHANGE UP (ref 3.8–10.5)

## 2021-10-18 PROCEDURE — 12345: CPT | Mod: NC

## 2021-10-18 PROCEDURE — 99223 1ST HOSP IP/OBS HIGH 75: CPT | Mod: GC

## 2021-10-18 RX ORDER — DEXTROSE 50 % IN WATER 50 %
25 SYRINGE (ML) INTRAVENOUS ONCE
Refills: 0 | Status: DISCONTINUED | OUTPATIENT
Start: 2021-10-18 | End: 2021-10-19

## 2021-10-18 RX ORDER — ENOXAPARIN SODIUM 100 MG/ML
70 INJECTION SUBCUTANEOUS
Qty: 0 | Refills: 0 | DISCHARGE

## 2021-10-18 RX ORDER — FERROUS SULFATE 325(65) MG
1 TABLET ORAL
Qty: 0 | Refills: 0 | DISCHARGE

## 2021-10-18 RX ORDER — SODIUM CHLORIDE 9 MG/ML
1000 INJECTION, SOLUTION INTRAVENOUS
Refills: 0 | Status: DISCONTINUED | OUTPATIENT
Start: 2021-10-18 | End: 2021-10-19

## 2021-10-18 RX ORDER — DEXTROSE 50 % IN WATER 50 %
12.5 SYRINGE (ML) INTRAVENOUS ONCE
Refills: 0 | Status: DISCONTINUED | OUTPATIENT
Start: 2021-10-18 | End: 2021-10-19

## 2021-10-18 RX ORDER — ONDANSETRON 8 MG/1
4 TABLET, FILM COATED ORAL EVERY 8 HOURS
Refills: 0 | Status: DISCONTINUED | OUTPATIENT
Start: 2021-10-18 | End: 2021-10-19

## 2021-10-18 RX ORDER — ENOXAPARIN SODIUM 100 MG/ML
70 INJECTION SUBCUTANEOUS EVERY 12 HOURS
Refills: 0 | Status: DISCONTINUED | OUTPATIENT
Start: 2021-10-18 | End: 2021-10-19

## 2021-10-18 RX ORDER — DEXTROSE 50 % IN WATER 50 %
15 SYRINGE (ML) INTRAVENOUS ONCE
Refills: 0 | Status: DISCONTINUED | OUTPATIENT
Start: 2021-10-18 | End: 2021-10-19

## 2021-10-18 RX ORDER — LANOLIN ALCOHOL/MO/W.PET/CERES
3 CREAM (GRAM) TOPICAL AT BEDTIME
Refills: 0 | Status: DISCONTINUED | OUTPATIENT
Start: 2021-10-18 | End: 2021-10-18

## 2021-10-18 RX ORDER — ACETAMINOPHEN 500 MG
650 TABLET ORAL EVERY 6 HOURS
Refills: 0 | Status: DISCONTINUED | OUTPATIENT
Start: 2021-10-18 | End: 2021-10-19

## 2021-10-18 RX ORDER — GLUCAGON INJECTION, SOLUTION 0.5 MG/.1ML
1 INJECTION, SOLUTION SUBCUTANEOUS ONCE
Refills: 0 | Status: DISCONTINUED | OUTPATIENT
Start: 2021-10-18 | End: 2021-10-19

## 2021-10-18 RX ORDER — ENOXAPARIN SODIUM 100 MG/ML
70 INJECTION SUBCUTANEOUS EVERY 12 HOURS
Refills: 0 | Status: DISCONTINUED | OUTPATIENT
Start: 2021-10-18 | End: 2021-10-18

## 2021-10-18 RX ADMIN — ENOXAPARIN SODIUM 70 MILLIGRAM(S): 100 INJECTION SUBCUTANEOUS at 17:48

## 2021-10-18 RX ADMIN — ENOXAPARIN SODIUM 70 MILLIGRAM(S): 100 INJECTION SUBCUTANEOUS at 04:59

## 2021-10-18 NOTE — PROGRESS NOTE ADULT - PROBLEM SELECTOR PLAN 4
At [facility], CTA positive for R subsegmental PE. Started on Lovenox 70 mg BID.  -Will restart Lovenox here At [facility], CTA positive for R subsegmental PE. Started on Lovenox 70 mg BID.    - c/w Lovenox 70mg BID here

## 2021-10-18 NOTE — PROGRESS NOTE ADULT - PROBLEM SELECTOR PLAN 2
-F/u Video EEG  -Neurology following, appreciate recs -F/u EEG  -Neurology following, appreciate recs

## 2021-10-18 NOTE — H&P ADULT - PROBLEM SELECTOR PLAN 3
Patient reports multiple episodes of hypoglycemia associated with diaphoresis, SOB, blurry vision and palpitations. Denies prior diabetes or gestational diabetes hx.  -F/u C-peptide  -F/u insulin  -F/u pro-insulin  -F/u Sulfonylurea level  -F/u A1C  -Hypoglycemia protocol  -FSG q4 Patient reports multiple episodes of hypoglycemia associated with diaphoresis, SOB, blurry vision and palpitations. Denies prior diabetes or gestational diabetes hx.  -F/u C-peptide  -F/u insulin  -F/u pro-insulin  -F/u Sulfonylurea level  -F/u A1C  -Hypoglycemia protocol  -FSG q4  -During acute episodes of hypoglycemia, should send off above labs before treating with hypoglycemia protocol. Patient reports multiple episodes of hypoglycemia associated with diaphoresis, SOB, blurry vision and palpitations. Denies prior diabetes or gestational diabetes hx.  -F/u C-peptide  -F/u insulin  -F/u pro-insulin  -F/u Sulfonylurea level  -F/u A1C  -Hypoglycemia protocol  -FSG q6  -Can consider Endo consult in AM Patient reports multiple episodes of hypoglycemia associated with diaphoresis, SOB, blurry vision and palpitations. Denies prior diabetes or gestational diabetes hx.  -F/u C-peptide  -F/u insulin  -F/u pro-insulin  -F/u sulfonylurea level  -F/u A1C  -Hypoglycemia protocol  -FSG q6h  -Can consider Endocrine consult in AM if pt with recurrent hypoglycemia or hyperglycemia

## 2021-10-18 NOTE — PROGRESS NOTE ADULT - ASSESSMENT
Patient is a 41 F at 22 weeks pregnant with PMHx of recently diagnosed PE x 1 week ago (on lovenox) presents to [facility] ED following a witnessed episode of syncope +/- seizure like activity.

## 2021-10-18 NOTE — H&P ADULT - ATTENDING COMMENTS
41 F at 22 weeks pregnant with PMHx of recently diagnosed PE x 1 week ago (on lovenox) presents to [facility] ED following a witnessed episode of syncope +/- seizure like activity. Neurology following. Video EEG pending. Will obtain MFM consult in the AM, as pregnancy near viable stage.

## 2021-10-18 NOTE — PROGRESS NOTE ADULT - SUBJECTIVE AND OBJECTIVE BOX
Abdiel Palma, PGY1  Internal Medicine      PATIENT: JESSICA WEST, MRN: [id number]    CHIEF COMPLAINT: Patient is a 41y old  Female who presents with a chief complaint of LOC (18 Oct 2021 07:13)      INTERVAL HISTORY/OVERNIGHT EVENTS:   - reports additional episode of dizziness/blurry vision while in stretcher this AM that self resolved; did not lose consciousness.   - denies CP/SOB now or during these episodes    REVIEW OF SYSTEMS:  Negative unless noted in HPI      MEDICATIONS:  MEDICATIONS  (STANDING):  dextrose 40% Gel 15 Gram(s) Oral once  dextrose 5%. 1000 milliLiter(s) (50 mL/Hr) IV Continuous <Continuous>  dextrose 5%. 1000 milliLiter(s) (100 mL/Hr) IV Continuous <Continuous>  dextrose 50% Injectable 25 Gram(s) IV Push once  dextrose 50% Injectable 12.5 Gram(s) IV Push once  dextrose 50% Injectable 25 Gram(s) IV Push once  enoxaparin Injectable 70 milliGRAM(s) SubCutaneous every 12 hours  glucagon  Injectable 1 milliGRAM(s) IntraMuscular once    MEDICATIONS  (PRN):  acetaminophen   Tablet .. 650 milliGRAM(s) Oral every 6 hours PRN Temp greater or equal to 38C (100.4F), Mild Pain (1 - 3)  ondansetron Injectable 4 milliGRAM(s) IV Push every 8 hours PRN Nausea and/or Vomiting      ALLERGIES: Allergies    penicillin (Rash)    Intolerances        OBJECTIVE:    VITALS:   Vital Signs Last 24 Hrs  T(C): 36.8 (18 Oct 2021 07:00), Max: 37.1 (17 Oct 2021 23:54)  T(F): 98.2 (18 Oct 2021 07:00), Max: 98.8 (17 Oct 2021 23:54)  HR: 71 (18 Oct 2021 09:08) (65 - 123)  BP: 114/67 (18 Oct 2021 09:08) (102/73 - 119/75)  BP(mean): --  RR: 16 (18 Oct 2021 09:08) (16 - 22)  SpO2: 100% (18 Oct 2021 09:08) (99% - 100%)    CAPILLARY BLOOD GLUCOSE      POCT Blood Glucose.: 91 mg/dL (18 Oct 2021 04:58)  POCT Blood Glucose.: 128 mg/dL (18 Oct 2021 02:15)  POCT Blood Glucose.: 70 mg/dL (17 Oct 2021 17:11)      I&O's Summary    Daily Height in cm: 154.94 (17 Oct 2021 17:04)    Daily     PHYSICAL EXAMINATION:  General: Comfortable, no acute distress, cooperative with exam.  HEENT: PERRLA, EOMI, moist mucous membranes.  Respiratory: CTAB, normal respiratory effort, no coughing, wheezes, crackles, or rales.  CV: Faint systolic murmur in LUSB. RRR, S1S2, no rubs or gallops. No JVD. Distal pulses intact.  Abdominal: Gravid. Soft, nontender, nondistended, no rebound or guarding.  Neurology: AOx3, no focal neuro defects, IBANEZ x 4.  Extremities: 1+ pitting edema b/l, + Peripheral pulses.      LABS:                        10.2   7.00  )-----------( 253      ( 18 Oct 2021 05:16 )             30.9     10-18    135  |  105  |  10  ----------------------------<  84  4.2   |  19<L>  |  0.68    Ca    8.9      18 Oct 2021 05:16  Phos  3.6     10-18  Mg     2.00     10-18    TPro  6.9  /  Alb  4.0  /  TBili  <0.2  /  DBili  x   /  AST  24  /  ALT  33  /  AlkPhos  68  10-17    PT/INR - ( 17 Oct 2021 19:20 )   PT: 10.2 sec;   INR: 0.89 ratio         PTT - ( 17 Oct 2021 19:20 )  PTT:33.4 sec      Urinalysis Basic - ( 17 Oct 2021 22:12 )    Color: Colorless / Appearance: Clear / S.009 / pH: x  Gluc: x / Ketone: Trace  / Bili: Negative / Urobili: <2 mg/dL   Blood: x / Protein: Negative / Nitrite: Negative   Leuk Esterase: Small / RBC: 1 /HPF / WBC 2 /HPF   Sq Epi: x / Non Sq Epi: 3 /HPF / Bacteria: Negative          MICRO:  Microbiology     EKG:    RADIOLOGY & ADDITIONAL TESTS:    IMAGING:

## 2021-10-18 NOTE — H&P ADULT - ASSESSMENT
Patient is a 41 F at 22 weeks pregnant with PMHx of recently diagnosed PE x 1 week ago (on lovenox) presents to [facility] ED following a witnessed episode of syncope +/- seizure like activity.

## 2021-10-18 NOTE — H&P ADULT - HISTORY OF PRESENT ILLNESS
Patient is a 41 F at 22 weeks pregnant with PMHx of recently diagnosed PE x 1 week ago (on lovenox) presents to [facility] ED following a witnessed episode of syncope and seizure like activity. Patient states she was driving with her daughter in the car when she suddenly felt short of breath. Patient states she quickly pulled into the driveway and parked her car. Then she felt dizzy, had blurry vision and diaphoretic and then lose consciousness for up to 30 seconds-1 minute. Patient's daughter noted the patient's body went stiff to the point where her foot locked on the gas, revving the engine which alerted other family members in the home. Patient eventually came to, noting family and EMS at her side. Family reports patient was extremely diaphoretic, stiff, not responsive to her name with eyes rolled back into her head. Noted urinary incontinence but no tongue biting, shaking of extremities, fecal incontinence. When EMS came, patient FSG noted to be low which she says happens often with these episodes. She received juice with EMS.     One week prior, she experienced multiple episodes of dizziness while driving, causing her to present to an OSH. CTA was done at that time (10/10/21) and was reported to show a R subsegmental PE for which she was initiated and discharged on Lovenox 70 BID. TTE performed showing EF 71%. At that time, patient reported a blood glucose level in the 60s on presentation at the OSH. During this current pregnancy, she reports recurrent episodes SOB followed by dizziness, blurry vision, diaphoresis and palpitations. often times with low FSG. However, this admission was the first time patient passed out. She does not report any associated headache, nausea, vomiting, fever, chills, or recent illness. Patient had episodes of shortness of breath during last pregnancy in 2018 but no dizziness, lightheadedness, diaphoresis or blurry vision then.

## 2021-10-18 NOTE — H&P ADULT - PROBLEM SELECTOR PLAN 4
At [facility], CTA positive for R subsegmental PE. Started on Lovenox 70 mg BID.  -Will restart Lovenox here

## 2021-10-18 NOTE — PROGRESS NOTE ADULT - PROBLEM SELECTOR PLAN 3
Patient reports multiple episodes of hypoglycemia associated with diaphoresis, SOB, blurry vision and palpitations. Denies prior diabetes or gestational diabetes hx.  -F/u C-peptide  -F/u insulin  -F/u pro-insulin  -F/u Sulfonylurea level  -F/u A1C  -Hypoglycemia protocol  -FSG q6  -Can consider Endo consult in AM Patient reports multiple episodes of hypoglycemia associated with diaphoresis, SOB, blurry vision and palpitations. Denies prior diabetes or gestational diabetes hx.  - insulin wnl, C-peptide mildly elevated (?poor renal clearance)  - A1C 4.8    Plan:  -F/u pro-insulin  -F/u Sulfonylurea level  -Hypoglycemia protocol  -FSG q6

## 2021-10-18 NOTE — H&P ADULT - PROBLEM SELECTOR PLAN 1
-EKG NSR without ischemic changes.  -CT head negative for acute intracranial pathology. CTA neg for PE or acute intrapulmonary pathology  -F/u Orthostatics  -F/u TSH, AM cortisol  -TTE  -Tele monitoring -EKG NSR without ischemic changes.  -F/u trop and pro-BNP  -CT head negative for acute intracranial pathology. CTA neg for PE or acute intrapulmonary pathology  -F/u Orthostatics  -F/u TSH, AM cortisol  -TTE  -Tele monitoring -EKG NSR without ischemic changes.  -F/u trop and pro-BNP  -CT head negative for acute intracranial pathology. CTA neg for PE or acute intrapulmonary pathology  -F/u Orthostatics  -F/u TSH, AM cortisol  -TTE ordered  -Tele monitoring

## 2021-10-18 NOTE — H&P ADULT - NSHPPHYSICALEXAM_GEN_ALL_CORE
PHYSICAL EXAM:  GENERAL: NAD, well-groomed, well-developed pregnant female  HEAD: Atraumatic, Normocephalic  EYES: EOMI, PERRL, conjunctiva and sclera clear  ENMT: Moist mucous membranes  NECK: Supple, No JVD  CHEST/LUNG: Clear to auscultation bilaterally  HEART: Regular rate and rhythm; S1 and S2; No murmurs, rubs, or gallops  ABDOMEN: Soft, Nontender, Nondistended, +pregnant abdomen  EXTREMITIES: Palpable Peripheral Pulses, No clubbing, cyanosis, or edema  LYMPH: No lymphadenopathy noted  SKIN: No rashes or lesions  MSK: no joint swelling or erythema  NEURO: AOx3, IBANEZ, non-focal exam Vital Signs Last 24 Hrs  T(C): 36.8 (18 Oct 2021 07:00), Max: 37.1 (17 Oct 2021 23:54)  T(F): 98.2 (18 Oct 2021 07:00), Max: 98.8 (17 Oct 2021 23:54)  HR: 65 (18 Oct 2021 07:00) (65 - 123)  BP: 102/73 (18 Oct 2021 07:00) (102/73 - 119/75)  BP(mean): --  RR: 16 (18 Oct 2021 07:00) (16 - 22)  SpO2: 100% (18 Oct 2021 07:00) (99% - 100%)    PHYSICAL EXAM:  General: Awake and alert.  No acute distress.  Head: Normocephalic, atraumatic.    Eyes: No nystagmus.  PERRL.  EOMI.  No scleral icterus.  No conjunctival pallor.  Mouth: Moist MM.  No oropharyngeal exudates.    Neck: Supple.  Full range of motion.  No JVD.  No LAD.  No thyromegaly.  Trachea midline.    Heart: RRR.  Normal S1 and S2.  No murmurs, rubs, or gallops.  No LE edema b/l.   Lungs: Nonlabored breathing.  Good inspiratory effort.  CTAB.  No wheezes, crackles, or rhonchi.    Abdomen: Gravid abdomen.  BS+, soft, nontender.  No palpable masses.  Skin: Warm and dry.  No rashes.  Extremities: No cyanosis.  2+ peripheral pulses b/l.  Musculoskeletal: No joint deformities.  No spinal or paraspinal tenderness.  Neuro: A&Ox3.  CN II-XII intact.  5/5 motor strength in UE and LE b/l.  Tactile sensation intact in UE and LE b/l.  No focal deficits.

## 2021-10-18 NOTE — H&P ADULT - PROBLEM SELECTOR PLAN 2
----- Message from Gena Juarez sent at 10/3/2017  2:34 PM CDT -----  Pt needs to be scheduled for a 1 week f/u. Please call pt to schedule.     Thank you  
Spoke with patient scheduled a follow-up appt with Dr. Orantes.  
-F/u Video EEG  -Neurology following, appreciate recs

## 2021-10-18 NOTE — H&P ADULT - NSHPREVIEWOFSYSTEMS_GEN_ALL_CORE
REVIEW OF SYSTEMS:    CONSTITUTIONAL: +Fatigue, lightheadedness, dizziness, diaphoresis. No weakness, fevers, night sweats, chills, weight loss, loss of appetite  HEENT: +Blurry vision. No headache, hearing changes, dysphagia or odynophagia    NECK: No neck pain or stiffness  RESPIRATORY: +SOB. No cough, wheezing, hemoptysis   CARDIOVASCULAR: +Palpitations. No chest pain orthopnea  GASTROINTESTINAL: No abdominal pain, nausea, vomiting, hematemesis, diarrhea or constipation. No melena or hematochezia.  GENITOURINARY: No dysuria, frequency or hematuria  NEUROLOGICAL: No numbness, weakness, paresthesias  MSK: No joint swelling or pain  HEME: No easy bruising, bleeding or LAD  SKIN: No itching, rashes  PSYCH: No mood changes, no SI/HI Constitutional: +Fatigue. No generalized weakness, fevers, chills, night sweats, or weight loss.  Eyes: +Blurry vision. No double vision or eye pain.  Ears, Nose, Mouth, Throat: No runny nose, sinus pain, ear pain, tinnitus, sore throat, dysphagia, or odynophagia  Cardiovascular: No chest pain, palpitations, or LE edema  Respiratory: No cough, wheezing, hemoptysis, or shortness of breath  Gastrointestinal: No abdominal pain, nausea/vomiting, diarrhea/constipation, hematemesis, melena, or BRBPR  Genitourinary: No dysuria, frequency, urgency, or hematuria  Musculoskeletal: No joint pain, joint swelling, or decreased ROM  Skin: No pruritus or rashes  Neurologic: +Lightheadedness and dizziness. +Syncope. No seizures, headache, paresthesias, numbness, or limb weakness.  Psychiatric: No depression, anxiety, difficulty concentrating, anhedonia, or lack of energy  Endocrine: No heat/cold intolerance, mood swings, sweats, polydipsia, or polyuria  Hematologic/lymphatic: No purpura, petechia, or prolonged or excessive bleeding after dental extraction / injury  Allergic/Immunologic: No anaphylaxis or allergic response to materials, foods, animals    Positives and pertinent negatives noted and all other systems negative.

## 2021-10-18 NOTE — PROGRESS NOTE ADULT - PROBLEM SELECTOR PLAN 5
- Evaluated by OB-GYN in ED  - Per OB, +FHR, gross fetal movement noted on US  - No acute OB interventions indicated at this time  - MFM consult in AM

## 2021-10-18 NOTE — H&P ADULT - PROBLEM SELECTOR PLAN 5
- Evaluated by OB-GYN in ED  - Per OB, +FHR, gross fetal movement noted on US  - No acute OB interventions indicated at this time - Evaluated by OB-GYN in ED  - Per OB, +FHR, gross fetal movement noted on US  - No acute OB interventions indicated at this time  - MFM consult in AM - Evaluated by OB-GYN in ED  - Per OB, +FHR, gross fetal movement noted on US  - No acute OB interventions indicated at this time  - MFM consult to be placed in AM, given pt's age, concern for underlying seizure, and recent PE diagnosis

## 2021-10-18 NOTE — PROGRESS NOTE ADULT - PROBLEM SELECTOR PLAN 1
-EKG NSR without ischemic changes.  -F/u trop and pro-BNP  -CT head negative for acute intracranial pathology. CTA neg for PE or acute intrapulmonary pathology  -F/u Orthostatics  -F/u TSH, AM cortisol  -TTE  -Tele monitoring 2/2 seizure i/s/o hypoglycemia and pregnancy vs cardiac etiology   per pt, episodes occurred during previous pregnancy (?during hypoglycemic episodes) and have not occurred when pt is not pregnant  -EKG, trop, BNP wnl  -CT head negative for acute intracranial pathology.   -CTA neg for PE or acute intrapulmonary pathology    Plan:  - c/w tele monitoring  - f/u TTE  - f/u EEG per neuro recs  - f/u Orthostatics  - f/u TSH, AM cortisol  - appreciate excellent neuro recs

## 2021-10-18 NOTE — H&P ADULT - NSHPLABSRESULTS_GEN_ALL_CORE
11.0   8.97  )-----------( 276      ( 17 Oct 2021 19:05 )             33.7     10-17    136  |  102  |  10  ----------------------------<  71  3.7   |  20<L>  |  0.57    Ca    9.3      17 Oct 2021 19:05  Mg     2.00     10-17    TPro  6.9  /  Alb  4.0  /  TBili  <0.2  /  DBili  x   /  AST  24  /  ALT  33  /  AlkPhos  68  10-17    Urinalysis + Microscopic Examination (10.17.21 @ 22:12)   Color: Colorless   Glucose Qualitative, Urine: Negative   Blood, Urine: Negative   Urine Appearance: Clear   Urobilinogen: <2 mg/dL   Specific Gravity: 1.009   Protein, Urine: Negative   pH Urine: 6.5   Leukocyte Esterase Concentration: Small   Nitrite: Negative   Ketone - Urine: Trace   Bilirubin: Negative   Red Blood Cell - Urine: 1 /HPF   White Blood Cell - Urine: 2 /HPF   Epithelial Cells: 3 /HPF   Hyaline Casts: 0 /LPF   Bacteria: Negative     Lipase, Serum: 27 U/L (10.17.21 @ 19:05) 11.0   8.97  )-----------( 276      ( 17 Oct 2021 19:05 )             33.7     10-17    136  |  102  |  10  ----------------------------<  71  3.7   |  20<L>  |  0.57    Ca    9.3      17 Oct 2021 19:05  Mg     2.00     10-17    TPro  6.9  /  Alb  4.0  /  TBili  <0.2  /  DBili  x   /  AST  24  /  ALT  33  /  AlkPhos  68  10-17    Urinalysis + Microscopic Examination (10.17.21 @ 22:12)   Color: Colorless   Glucose Qualitative, Urine: Negative   Blood, Urine: Negative   Urine Appearance: Clear   Urobilinogen: <2 mg/dL   Specific Gravity: 1.009   Protein, Urine: Negative   pH Urine: 6.5   Leukocyte Esterase Concentration: Small   Nitrite: Negative   Ketone - Urine: Trace   Bilirubin: Negative   Red Blood Cell - Urine: 1 /HPF   White Blood Cell - Urine: 2 /HPF   Epithelial Cells: 3 /HPF   Hyaline Casts: 0 /LPF   Bacteria: Negative     Lipase, Serum: 27 U/L (10.17.21 @ 19:05)    < from: CT Angio Chest PE Protocol w/ IV Cont (10.17.21 @ 22:39) >    INTERPRETATION:  CLINICAL INFORMATION: Lightheadedness and shortness of breath. Evaluate for PE.    COMPARISON: CT chest on 1/8/2018.    CONTRAST/COMPLICATIONS:  IV Contrast: Omnipaque 350  70 cc administered   30 cc discarded  Oral Contrast: NONE  Complications: None reported at time of study completion    PROCEDURE:  CT Angiography of the Chest.  Sagittal and coronal reformats were performed as well as 3D (MIP) reconstructions.    FINDINGS:    LUNGS AND AIRWAYS: Patent central airways.  Bibasilar subsegmental atelectasis.  PLEURA: No pleural effusion.  MEDIASTINUM AND DEMETRIUS: No lymphadenopathy.  VESSELS: No pulmonary embolism within main, left main, right main, lobar, or segmental pulmonary arteries. Evaluation of the subsegmental arteries is limited by suboptimal contrast opacification. Normal caliber thoracic aorta.  HEART: Heart size is normal. No pericardial effusion.  CHEST WALL AND LOWER NECK: Withinnormal limits.  VISUALIZED UPPER ABDOMEN: Within normal limits.  BONES: Mild degenerative changes of the spine.    IMPRESSION:  No pulmonary embolism.    < from: CT Head No Cont (10.17.21 @ 22:38) >    IMPRESSION:    No acute intracranial hemorrhage, mass effect, or evidence of acute vascular territorial infarction. If clinical symptoms persist or worsen, more sensitive evaluation with brain MRI may be obtained, if no contraindications exist. Labs personally reviewed.  11.0   8.97  )-----------( 276      ( 17 Oct 2021 19:05 )             33.7     10-17    136  |  102  |  10  ----------------------------<  71  3.7   |  20<L>  |  0.57    Ca    9.3      17 Oct 2021 19:05  Mg     2.00     10-17    TPro  6.9  /  Alb  4.0  /  TBili  <0.2  /  DBili  x   /  AST  24  /  ALT  33  /  AlkPhos  68  10-17    Urinalysis + Microscopic Examination (10.17.21 @ 22:12)   Color: Colorless   Glucose Qualitative, Urine: Negative   Blood, Urine: Negative   Urine Appearance: Clear   Urobilinogen: <2 mg/dL   Specific Gravity: 1.009   Protein, Urine: Negative   pH Urine: 6.5   Leukocyte Esterase Concentration: Small   Nitrite: Negative   Ketone - Urine: Trace   Bilirubin: Negative   Red Blood Cell - Urine: 1 /HPF   White Blood Cell - Urine: 2 /HPF   Epithelial Cells: 3 /HPF   Hyaline Casts: 0 /LPF   Bacteria: Negative     Lipase, Serum: 27 U/L (10.17.21 @ 19:05)    Imaging personally reviewed.  < from: CT Angio Chest PE Protocol w/ IV Cont (10.17.21 @ 22:39) >    INTERPRETATION:  CLINICAL INFORMATION: Lightheadedness and shortness of breath. Evaluate for PE.    COMPARISON: CT chest on 1/8/2018.    CONTRAST/COMPLICATIONS:  IV Contrast: Omnipaque 350  70 cc administered   30 cc discarded  Oral Contrast: NONE  Complications: None reported at time of study completion    PROCEDURE:  CT Angiography of the Chest.  Sagittal and coronal reformats were performed as well as 3D (MIP) reconstructions.    FINDINGS:    LUNGS AND AIRWAYS: Patent central airways.  Bibasilar subsegmental atelectasis.  PLEURA: No pleural effusion.  MEDIASTINUM AND DEMETRIUS: No lymphadenopathy.  VESSELS: No pulmonary embolism within main, left main, right main, lobar, or segmental pulmonary arteries. Evaluation of the subsegmental arteries is limited by suboptimal contrast opacification. Normal caliber thoracic aorta.  HEART: Heart size is normal. No pericardial effusion.  CHEST WALL AND LOWER NECK: Withinnormal limits.  VISUALIZED UPPER ABDOMEN: Within normal limits.  BONES: Mild degenerative changes of the spine.    IMPRESSION:  No pulmonary embolism.    < from: CT Head No Cont (10.17.21 @ 22:38) >    IMPRESSION:    No acute intracranial hemorrhage, mass effect, or evidence of acute vascular territorial infarction. If clinical symptoms persist or worsen, more sensitive evaluation with brain MRI may be obtained, if no contraindications exist.

## 2021-10-18 NOTE — H&P ADULT - NSHPSOCIALHISTORY_GEN_ALL_CORE
Lives with parents, has 4 children  Non-smoker  Social drinker, no alcohol use during pregnancy  No recreational drug use

## 2021-10-19 ENCOUNTER — TRANSCRIPTION ENCOUNTER (OUTPATIENT)
Age: 41
End: 2021-10-19

## 2021-10-19 VITALS
SYSTOLIC BLOOD PRESSURE: 103 MMHG | RESPIRATION RATE: 17 BRPM | DIASTOLIC BLOOD PRESSURE: 62 MMHG | TEMPERATURE: 98 F | OXYGEN SATURATION: 98 % | HEART RATE: 79 BPM

## 2021-10-19 DIAGNOSIS — R00.0 TACHYCARDIA, UNSPECIFIED: ICD-10-CM

## 2021-10-19 LAB
ALBUMIN SERPL ELPH-MCNC: 3.4 G/DL — SIGNIFICANT CHANGE UP (ref 3.3–5)
ALP SERPL-CCNC: 57 U/L — SIGNIFICANT CHANGE UP (ref 40–120)
ALT FLD-CCNC: 21 U/L — SIGNIFICANT CHANGE UP (ref 4–33)
ANION GAP SERPL CALC-SCNC: 12 MMOL/L — SIGNIFICANT CHANGE UP (ref 7–14)
AST SERPL-CCNC: 13 U/L — SIGNIFICANT CHANGE UP (ref 4–32)
BILIRUB SERPL-MCNC: <0.2 MG/DL — SIGNIFICANT CHANGE UP (ref 0.2–1.2)
BUN SERPL-MCNC: 16 MG/DL — SIGNIFICANT CHANGE UP (ref 7–23)
CALCIUM SERPL-MCNC: 8.7 MG/DL — SIGNIFICANT CHANGE UP (ref 8.4–10.5)
CHLORIDE SERPL-SCNC: 104 MMOL/L — SIGNIFICANT CHANGE UP (ref 98–107)
CO2 SERPL-SCNC: 19 MMOL/L — LOW (ref 22–31)
COVID-19 SPIKE DOMAIN AB INTERP: NEGATIVE — SIGNIFICANT CHANGE UP
COVID-19 SPIKE DOMAIN ANTIBODY RESULT: 0.4 U/ML — SIGNIFICANT CHANGE UP
CREAT SERPL-MCNC: 0.69 MG/DL — SIGNIFICANT CHANGE UP (ref 0.5–1.3)
CULTURE RESULTS: SIGNIFICANT CHANGE UP
GLUCOSE SERPL-MCNC: 75 MG/DL — SIGNIFICANT CHANGE UP (ref 70–99)
HCT VFR BLD CALC: 32.9 % — LOW (ref 34.5–45)
HGB BLD-MCNC: 10.5 G/DL — LOW (ref 11.5–15.5)
MAGNESIUM SERPL-MCNC: 1.8 MG/DL — SIGNIFICANT CHANGE UP (ref 1.6–2.6)
MCHC RBC-ENTMCNC: 29.6 PG — SIGNIFICANT CHANGE UP (ref 27–34)
MCHC RBC-ENTMCNC: 31.9 GM/DL — LOW (ref 32–36)
MCV RBC AUTO: 92.7 FL — SIGNIFICANT CHANGE UP (ref 80–100)
NRBC # BLD: 0 /100 WBCS — SIGNIFICANT CHANGE UP
NRBC # FLD: 0 K/UL — SIGNIFICANT CHANGE UP
PHOSPHATE SERPL-MCNC: 4.8 MG/DL — HIGH (ref 2.5–4.5)
PLATELET # BLD AUTO: 269 K/UL — SIGNIFICANT CHANGE UP (ref 150–400)
POTASSIUM SERPL-MCNC: 4 MMOL/L — SIGNIFICANT CHANGE UP (ref 3.5–5.3)
POTASSIUM SERPL-SCNC: 4 MMOL/L — SIGNIFICANT CHANGE UP (ref 3.5–5.3)
PROT SERPL-MCNC: 6 G/DL — SIGNIFICANT CHANGE UP (ref 6–8.3)
RBC # BLD: 3.55 M/UL — LOW (ref 3.8–5.2)
RBC # FLD: 17.4 % — HIGH (ref 10.3–14.5)
SARS-COV-2 IGG+IGM SERPL QL IA: 0.4 U/ML — SIGNIFICANT CHANGE UP
SARS-COV-2 IGG+IGM SERPL QL IA: NEGATIVE — SIGNIFICANT CHANGE UP
SODIUM SERPL-SCNC: 135 MMOL/L — SIGNIFICANT CHANGE UP (ref 135–145)
SPECIMEN SOURCE: SIGNIFICANT CHANGE UP
WBC # BLD: 6.59 K/UL — SIGNIFICANT CHANGE UP (ref 3.8–10.5)
WBC # FLD AUTO: 6.59 K/UL — SIGNIFICANT CHANGE UP (ref 3.8–10.5)

## 2021-10-19 PROCEDURE — 99233 SBSQ HOSP IP/OBS HIGH 50: CPT | Mod: GC

## 2021-10-19 PROCEDURE — 95816 EEG AWAKE AND DROWSY: CPT | Mod: 26

## 2021-10-19 PROCEDURE — 99222 1ST HOSP IP/OBS MODERATE 55: CPT | Mod: GC

## 2021-10-19 RX ORDER — ISOPROPYL ALCOHOL, BENZOCAINE .7; .06 ML/ML; ML/ML
1 SWAB TOPICAL
Qty: 100 | Refills: 1
Start: 2021-10-19 | End: 2021-12-07

## 2021-10-19 RX ADMIN — ENOXAPARIN SODIUM 70 MILLIGRAM(S): 100 INJECTION SUBCUTANEOUS at 05:57

## 2021-10-19 RX ADMIN — ENOXAPARIN SODIUM 70 MILLIGRAM(S): 100 INJECTION SUBCUTANEOUS at 17:47

## 2021-10-19 NOTE — DISCHARGE NOTE PROVIDER - NSFOLLOWUPCLINICS_GEN_ALL_ED_FT
[facility]  Neurology Epilepsy  [address] [place] [id number]  Phone: [phone]  Fax: [phone]    [facility]  Neurology  [address] [facility] - [address] [place] [id number]  Phone: [phone]  Fax:

## 2021-10-19 NOTE — DISCHARGE NOTE PROVIDER - HOSPITAL COURSE
Patient is a 41 F at 22 weeks pregnant with PMHx of recently diagnosed PE x 1 week ago (on lovenox) presents to [facility] ED following a witnessed episode of syncope and seizure like activity. Patient states she was driving with her daughter in the car when she suddenly felt short of breath, pulled into the driveway and parked her car. Then she felt dizzy, had blurry vision and diaphoretic and then lost consciousness for up to 30 seconds-1 minute. Patient's daughter noted the patient's body went stiff to the point where her foot locked on the gas, revving the engine which alerted other family members in the home. Patient eventually came to, noting family and EMS at her side. Family reports patient was extremely diaphoretic, stiff, not responsive to her name with eyes rolled back into her head. Noted urinary incontinence but no tongue biting, shaking of extremities, fecal incontinence. When EMS came, patient FSG low which she says happens often with these episodes. She received juice with EMS.     One week prior, she experienced multiple episodes of dizziness while driving, causing her to present to an OSH. CTA was done at that time (10/10/21) and was reported to show a R subsegmental PE for which she was initiated and discharged on Lovenox 70 BID. TTE performed showing EF 71%. At that time, patient reported a blood glucose level in the 60s on presentation at the OSH. During this current pregnancy, she reports recurrent episodes SOB followed by dizziness, blurry vision, diaphoresis and palpitations. often times with low FSG. However, this admission was the first time patient passed out. She does not report any associated headache, nausea, vomiting, fever, chills, or recent illness. Patient had episodes of shortness of breath during last pregnancy in 2018 but no dizziness, lightheadedness, diaphoresis or blurry vision then.      Pt was admitted for workup of syncope and seen by neurology. EEG was performed and wnl, but sx were concerning for convulsive syncope. Pt was recommended to get MRI brain as outpatient. Pt had FSG 70-80s this admission despite eating full meals.     She was discharged to follow up with endocrinology. Patient is a 41 F at 22 weeks pregnant with PMHx of recently diagnosed PE x 1 week ago (on lovenox) presents to [facility] ED following a witnessed episode of syncope and seizure like activity. Patient states she was driving with her daughter in the car when she suddenly felt short of breath, pulled into the driveway and parked her car. Then she felt dizzy, had blurry vision and diaphoretic and then lost consciousness for up to 30 seconds-1 minute. Patient's daughter noted the patient's body went stiff to the point where her foot locked on the gas, revving the engine which alerted other family members in the home. Patient eventually came to, noting family and EMS at her side. Family reports patient was extremely diaphoretic, stiff, not responsive to her name with eyes rolled back into her head. Noted urinary incontinence but no tongue biting, shaking of extremities, fecal incontinence. When EMS came, patient FSG low which she says happens often with these episodes. She received juice with EMS.     One week prior, she experienced multiple episodes of dizziness while driving, causing her to present to an OSH. CTA was done at that time (10/10/21) and was reported to show a R subsegmental PE for which she was initiated and discharged on Lovenox 70 BID. TTE performed showing EF 71%. At that time, patient reported a blood glucose level in the 60s on presentation at the OSH. During this current pregnancy, she reports recurrent episodes SOB followed by dizziness, blurry vision, diaphoresis and palpitations. often times with low FSG. However, this admission was the first time patient passed out. She does not report any associated headache, nausea, vomiting, fever, chills, or recent illness. Patient had episodes of shortness of breath during last pregnancy in 2018 but no dizziness, lightheadedness, diaphoresis or blurry vision then.      Hospital course:  Pt was admitted for workup of syncope and seen by neurology. EEG was performed and wnl, but sx were concerning for convulsive syncope. Pt was recommended to get MRI brain as outpatient. Pt had FSG 70-80s this admission despite eating full meals. Pt was also seen by endocrinology and recommended for workup as an outpatient for other causes of syncope (e.g. per PE) as her sxs did not correlate with food intake. Patient is a 41 F at 22 weeks pregnant with PMHx of recently diagnosed PE x 1 week ago (on lovenox) presents to [facility] ED following a witnessed episode of syncope and seizure like activity. Patient states she was driving with her daughter in the car when she suddenly felt short of breath, pulled into the driveway and parked her car. Then she felt dizzy, had blurry vision and diaphoretic and then lost consciousness for up to 30 seconds-1 minute. Patient's daughter noted the patient's body went stiff to the point where her foot locked on the gas, revving the engine which alerted other family members in the home. Patient eventually came to, noting family and EMS at her side. Family reports patient was extremely diaphoretic, stiff, not responsive to her name with eyes rolled back into her head. Noted urinary incontinence but no tongue biting, shaking of extremities, fecal incontinence. When EMS came, patient FSG low which she says happens often with these episodes. She received juice with EMS.     One week prior, she experienced multiple episodes of dizziness while driving, causing her to present to an OSH. CTA was done at that time (10/10/21) and was reported to show a R subsegmental PE for which she was initiated and discharged on Lovenox 70 BID. TTE performed showing EF 71%. At that time, patient reported a blood glucose level in the 60s on presentation at the OSH. During this current pregnancy, she reports recurrent episodes SOB followed by dizziness, blurry vision, diaphoresis and palpitations. often times with low FSG. However, this admission was the first time patient passed out. She does not report any associated headache, nausea, vomiting, fever, chills, or recent illness. Patient had episodes of shortness of breath during last pregnancy in 2018 but no dizziness, lightheadedness, diaphoresis or blurry vision then.      Hospital course:  Pt was admitted for workup of syncope and seen by neurology. EEG was performed and wnl, but sx were concerning for convulsive syncope. Pt was recommended to get MRI brain as outpatient. Pt had FSG 70-80s this admission despite eating full meals. Pt was also seen by endocrinology and recommended for workup as an outpatient for other causes of syncope (e.g. per PE) as her sxs did not correlate with food intake. On the day of discharge pt is medically optimized and stable for discharge.

## 2021-10-19 NOTE — CONSULT NOTE ADULT - SUBJECTIVE AND OBJECTIVE BOX
HPI:  Patient is a 41 F at 22 weeks pregnant with PMHx of recently diagnosed PE x 1 week ago (on lovenox) presents to [facility] ED following a witnessed episode of syncope and seizure like activity. Patient states she was driving with her daughter in the car when she suddenly felt short of breath. Patient states she quickly pulled into the driveway and parked her car. Then she felt dizzy, had blurry vision and diaphoretic and then lose consciousness for up to 30 seconds-1 minute. Patient's daughter noted the patient's body went stiff to the point where her foot locked on the gas, revving the engine which alerted other family members in the home. Patient eventually came to, noting family and EMS at her side. Family reports patient was extremely diaphoretic, stiff, not responsive to her name with eyes rolled back into her head. Noted urinary incontinence but no tongue biting, shaking of extremities, fecal incontinence. When EMS came, patient FSG noted to be low which she says happens often with these episodes. She received juice with EMS.     One week prior, she experienced multiple episodes of dizziness while driving, causing her to present to an OSH. CTA was done at that time (10/10/21) and was reported to show a R subsegmental PE for which she was initiated and discharged on Lovenox 70 BID. TTE performed showing EF 71%. At that time, patient reported a blood glucose level in the 60s on presentation at the OSH. During this current pregnancy, she reports recurrent episodes SOB followed by dizziness, blurry vision, diaphoresis and palpitations. often times with low FSG. However, this admission was the first time patient passed out. She does not report any associated headache, nausea, vomiting, fever, chills, or recent illness. Patient had episodes of shortness of breath during last pregnancy in 2018 but no dizziness, lightheadedness, diaphoresis or blurry vision then.     (18 Oct 2021 03:05)    Denies hx of T2DM or gestational DM. Denies hx of low blood sugars. Denies history of dizziness, sweats, headache, shakiness prior to pregnancy or during previous pregnancies. Reports during current pregnancy, has been having intermittent episodes of difficulty breathing, blurry vision, dizziness since 2021. Reports symptoms occur randomly, not related to exertion. Denies any relation to food. Denies improvement in symptoms with eating. States symptoms resolve with relaxing, deep breathing episodes. Denies palpitations anxiety, hx of panic attacks.   On , had episode of passing out while in the car (was no longer driving at the time) for estimated 2 minutes per father. Came to consciousness spontaneously and started to feel better with deep breathing. Was given orange juice by neighbor but states by that time she was already feeling better. Reports appetite/PO intake has been good during pregnancy. Eats three meals a day.            PAST MEDICAL & SURGICAL HISTORY:  Pulmonary embolism  10/2021    H/O  section          FAMILY HISTORY:  Denies family hx of low glucose or pancreatic/abdominal masses/lesion    Social History:  Denies tobacco use   Denies ETOH use      Outpatient Medications: Home Medications:  Lovenox: 70 unit(s) injectable every 12 hours (18 Oct 2021 03:48)      MEDICATIONS  (STANDING):  dextrose 40% Gel 15 Gram(s) Oral once  dextrose 5%. 1000 milliLiter(s) (50 mL/Hr) IV Continuous <Continuous>  dextrose 5%. 1000 milliLiter(s) (100 mL/Hr) IV Continuous <Continuous>  dextrose 50% Injectable 25 Gram(s) IV Push once  dextrose 50% Injectable 12.5 Gram(s) IV Push once  dextrose 50% Injectable 25 Gram(s) IV Push once  enoxaparin Injectable 70 milliGRAM(s) SubCutaneous every 12 hours  glucagon  Injectable 1 milliGRAM(s) IntraMuscular once    MEDICATIONS  (PRN):  acetaminophen   Tablet .. 650 milliGRAM(s) Oral every 6 hours PRN Temp greater or equal to 38C (100.4F), Mild Pain (1 - 3)  ondansetron Injectable 4 milliGRAM(s) IV Push every 8 hours PRN Nausea and/or Vomiting      Allergies    penicillin (Rash)    Intolerances      Review of Systems:  Constitutional: No fever, chills   Neuro: No tremors, headache   Cardiovascular: No chest pain, palpitations  Respiratory: No SOB, no cough  GI: No nausea, vomiting, abdominal pain  : No dysuria, polyuria   Skin: no rash, ulcers   Psych: no depression, anxiety   Endocrine: no polyphagia, polydipsia     ALL OTHER SYSTEMS REVIEWED AND NEGATIVE        PHYSICAL EXAM:  VITALS: T(C): 36.3 (10-19-21 @ 05:00)  T(F): 97.3 (10-19-21 @ 05:00), Max: 99.2 (10-18-21 @ 21:00)  HR: 89 (10-19-21 @ 05:00) (73 - 89)  BP: 110/60 (10-19-21 @ 05:00) (104/61 - 122/80)  RR:  (18 - 18)  SpO2:  (99% - 100%)  Wt(kg): --  GENERAL: NAD, well-groomed, well-developed  EYES: No proptosis, anicteric  HEENT:  Atraumatic, Normocephalic, moist mucous membranes  THYROID: Normal size, no palpable nodules  RESPIRATORY: Clear to auscultation bilaterally; No rales, rhonchi, wheezing  CARDIOVASCULAR: Regular rate and rhythm; No murmurs  GI: Soft, nontender, non distended, normal bowel sounds  SKIN: Dry, intact, No rashes or lesions  NEURO: AOx3, moves all extremities spontaneously   PSYCH: Reactive affect, euthymic mood    POCT Blood Glucose.: 71 mg/dL (10-19-21 @ 12:44)  POCT Blood Glucose.: 85 mg/dL (10-19-21 @ 06:49)  POCT Blood Glucose.: 93 mg/dL (10-18-21 @ 23:41)  POCT Blood Glucose.: 83 mg/dL (10-18-21 @ 18:31)  POCT Blood Glucose.: 103 mg/dL (10-18-21 @ 16:30)  POCT Blood Glucose.: 91 mg/dL (10-18-21 @ 04:58)  POCT Blood Glucose.: 128 mg/dL (10-18-21 @ 02:15)  POCT Blood Glucose.: 70 mg/dL (10-17-21 @ 17:11)                            10.5   6.59  )-----------( 269      ( 19 Oct 2021 07:58 )             32.9       10-19    135  |  104  |  16  ----------------------------<  75  4.0   |  19<L>  |  0.69    EGFR if : 124  EGFR if non : 107    Ca    8.7      10-  Mg     1.80     10-  Phos  4.8     10-    TPro  6.0  /  Alb  3.4  /  TBili  <0.2  /  DBili  x   /  AST  13  /  ALT  21  /  AlkPhos  57  10-19      Thyroid Function Tests:  10- @ 05:20 TSH 3.56 FreeT4 -- T3 -- Anti TPO -- Anti Thyroglobulin Ab -- TSI --              Radiology:                HPI:  Patient is a 41 F at 22 weeks pregnant with PMHx of recently diagnosed PE x 1 week ago (on lovenox) presents to [facility] ED following a witnessed episode of syncope.    Denies hx of T2DM or gestational DM. Denies hx of low blood sugars. Denies history of dizziness, sweats, headache, shakiness prior to pregnancy or during previous pregnancies. Reports during current pregnancy, has been having intermittent episodes of difficulty breathing, blurry vision, dizziness since 2021. Reports symptoms occur randomly, not related to exertion. Denies any relation to food. Denies improvement in symptoms with eating. States symptoms resolve with relaxing, deep breathing episodes. Denies palpitations anxiety, hx of panic attacks.   On , had episode of passing out while in the car (was no longer driving at the time) for estimated 2 minutes per father. Came to consciousness spontaneously and started to feel better with deep breathing. Was given orange juice by neighbor but states by that time she was already feeling better. Reports appetite/PO intake has been good during pregnancy. Eats three meals a day.            PAST MEDICAL & SURGICAL HISTORY:  Pulmonary embolism  10/2021    H/O  section          FAMILY HISTORY:  Denies family hx of low glucose or pancreatic/abdominal masses/lesion    Social History:  Denies tobacco use   Denies ETOH use      Outpatient Medications: Home Medications:  Lovenox: 70 unit(s) injectable every 12 hours (18 Oct 2021 03:48)      MEDICATIONS  (STANDING):  dextrose 40% Gel 15 Gram(s) Oral once  dextrose 5%. 1000 milliLiter(s) (50 mL/Hr) IV Continuous <Continuous>  dextrose 5%. 1000 milliLiter(s) (100 mL/Hr) IV Continuous <Continuous>  dextrose 50% Injectable 25 Gram(s) IV Push once  dextrose 50% Injectable 12.5 Gram(s) IV Push once  dextrose 50% Injectable 25 Gram(s) IV Push once  enoxaparin Injectable 70 milliGRAM(s) SubCutaneous every 12 hours  glucagon  Injectable 1 milliGRAM(s) IntraMuscular once    MEDICATIONS  (PRN):  acetaminophen   Tablet .. 650 milliGRAM(s) Oral every 6 hours PRN Temp greater or equal to 38C (100.4F), Mild Pain (1 - 3)  ondansetron Injectable 4 milliGRAM(s) IV Push every 8 hours PRN Nausea and/or Vomiting      Allergies    penicillin (Rash)    Intolerances      Review of Systems:  Constitutional: No fever, chills   Neuro: No tremors, headache   Cardiovascular: No chest pain, palpitations  Respiratory: No SOB, no cough  GI: No nausea, vomiting, abdominal pain  : No dysuria, polyuria   Skin: no rash, ulcers   Psych: no depression, anxiety   Endocrine: no polyphagia, polydipsia     ALL OTHER SYSTEMS REVIEWED AND NEGATIVE        PHYSICAL EXAM:  VITALS: T(C): 36.3 (10-19-21 @ 05:00)  T(F): 97.3 (10-19-21 @ 05:00), Max: 99.2 (10-18-21 @ 21:00)  HR: 89 (10-19-21 @ 05:00) (73 - 89)  BP: 110/60 (10-19-21 @ 05:00) (104/61 - 122/80)  RR:  (18 - 18)  SpO2:  (99% - 100%)  Wt(kg): --  GENERAL: NAD, well-groomed, well-developed  EYES: No proptosis, anicteric  HEENT:  Atraumatic, Normocephalic, moist mucous membranes  THYROID: Normal size  RESPIRATORY: no respiratory distress, no accessory muscle use  GI: Soft, nontender, non distended, normal bowel sounds  SKIN: Dry, intact, No rashes or lesions  NEURO: AOx3, moves all extremities spontaneously   PSYCH: Reactive affect, euthymic mood    POCT Blood Glucose.: 71 mg/dL (10-19-21 @ 12:44)  POCT Blood Glucose.: 85 mg/dL (10-19-21 @ 06:49)  POCT Blood Glucose.: 93 mg/dL (10-18-21 @ 23:41)  POCT Blood Glucose.: 83 mg/dL (10-18-21 @ 18:31)  POCT Blood Glucose.: 103 mg/dL (10-18-21 @ 16:30)  POCT Blood Glucose.: 91 mg/dL (10-18-21 @ 04:58)  POCT Blood Glucose.: 128 mg/dL (10-18-21 @ 02:15)  POCT Blood Glucose.: 70 mg/dL (10-17-21 @ 17:11)                            10.5   6.59  )-----------( 269      ( 19 Oct 2021 07:58 )             32.9       10-19    135  |  104  |  16  ----------------------------<  75  4.0   |  19<L>  |  0.69    EGFR if : 124  EGFR if non : 107    Ca    8.7      10-19  Mg     1.80     10-19  Phos  4.8     10-19    TPro  6.0  /  Alb  3.4  /  TBili  <0.2  /  DBili  x   /  AST  13  /  ALT  21  /  AlkPhos  57  10-19      Thyroid Function Tests:  10-18 @ 05:20 TSH 3.56 FreeT4 -- T3 -- Anti TPO -- Anti Thyroglobulin Ab -- TSI --              Radiology:

## 2021-10-19 NOTE — PROGRESS NOTE ADULT - PROBLEM SELECTOR PLAN 6
Diet: Regular  DVT ppx: Lovenox 70 BID  Dispo: Home pending clinical outcome - Evaluated by OB-GYN in ED  - Per OB, +FHR, gross fetal movement noted on US  - No acute OB interventions indicated at this time

## 2021-10-19 NOTE — CONSULT NOTE ADULT - ATTENDING COMMENTS
No hypoglycemia noted, not consistent with endocrine etiology.  Glucose in pregnancy commonly runs as low as 60 for normal/ideal range.  No further endocrine workup at this time.  Consider PE as etiology of symptoms.    Tony Webb MD  Division of Endocrinology  Pager: [phone]    If after 6PM or before 9AM, or on weekends/holidays, please call endocrine answering service for assistance ([phone]).  For nonurgent matters email [email or web address] for assistance.
41 y.o. RH W  (5.5 months pregnant), recent diagnosis of PE (on lovenox) p/w episode of stiffening with quick return to BL prceded by SOB and lightheadedness VSS on presentation. Exam without significant findings CTH -    Impression: most continue with convulsive syncope, unlikely seizure    Routine EEG  MRi pending very low suspicion for acute central process. does not need to delay DC

## 2021-10-19 NOTE — DISCHARGE NOTE PROVIDER - NSDCMRMEDTOKEN_GEN_ALL_CORE_FT
alcohol swabs : Apply topically to affected area 4 times a day   glucometer (per patient&#x27;s insurance): Test blood sugars four times a day. Dispense #1 glucometer.  Lovenox: 70 unit(s) injectable every 12 hours  test strips (per patient&#x27;s insurance): 1 application subcutaneously 4 times a day. ** Compatible with patient&#x27;s glucometer **   alcohol swabs : Apply topically to affected area 4 times a day   glucometer (per patient&#x27;s insurance): Test blood sugars four times a day. Dispense #1 glucometer.  lancets: 1 application subcutaneously 4 times a day   Lovenox: 70 unit(s) injectable every 12 hours  test strips (per patient&#x27;s insurance): 1 application subcutaneously 4 times a day. ** Compatible with patient&#x27;s glucometer **

## 2021-10-19 NOTE — PROGRESS NOTE ADULT - ASSESSMENT
Patient is a 41 F at 22 weeks pregnant with PMHx of recently diagnosed PE x 1 week ago (on lovenox) presents to [facility] ED following a witnessed episode of syncope +/- seizure like activity i/s/o hypoglycemia.  Patient is a 41 F at 22 weeks pregnant with PMHx of recently diagnosed PE x 1 week ago (on lovenox) presented to [facility] ED following a witnessed episode of syncope +/- seizure like activity i/s/o hypoglycemia.

## 2021-10-19 NOTE — PROGRESS NOTE ADULT - ATTENDING COMMENTS
41F 22 weeks pregnant diagnosed w/ RLL PE 1 week ago p/w syncope, r/o seizure  -awaiting EEG  -monitor on telemetry  -monitor FS   -CTPA and 2D echo done at [facility] last week, reviewed. CTPA - acute RLL PE, 2D echo - EF 71%, wnl
41F 22 weeks pregnant diagnosed w/ RLL PE 1 week ago p/w syncope, r/o seizure  -awaiting EEG  -monitor on telemetry: some tachy, likely related to physiologic changes in pregnancy   -FS stable, but low normal, C-peptide was mildly elevated, Endo c/s   -CTPA and 2D echo done at [facility] last week, reviewed. CTPA - acute RLL PE, 2D echo - EF 71%, wnl

## 2021-10-19 NOTE — PROGRESS NOTE ADULT - PROBLEM SELECTOR PLAN 1
2/2 seizure i/s/o hypoglycemia and pregnancy vs cardiac etiology   per pt, episodes occurred during previous pregnancy (?during hypoglycemic episodes) and have not occurred when pt is not pregnant  -EKG, trop, BNP wnl  -CT head negative for acute intracranial pathology.   -CTA neg for PE or acute intrapulmonary pathology    Plan:  - c/w tele monitoring  - f/u TTE  - f/u EEG per neuro recs  - f/u Orthostatics  - f/u TSH, AM cortisol  - appreciate excellent neuro recs 2/2 seizure i/s/o hypoglycemia and pregnancy vs cardiac etiology   per pt, episodes occurred during previous pregnancy (?during hypoglycemic episodes) and have not occurred when pt is not pregnant  -EKG, trop, BNP wnl  -CT head negative for acute intracranial pathology.   -CTA neg for PE or acute intrapulmonary pathology    Plan:  - c/w tele monitoring  - f/u EEG per neuro recs  - f/u TSH, AM cortisol  - appreciate neuro recs

## 2021-10-19 NOTE — DISCHARGE NOTE PROVIDER - NSDCFUADDAPPT_GEN_ALL_CORE_FT
Please also follow up with your primary care doctor Please also follow up with your primary care doctor.

## 2021-10-19 NOTE — PROGRESS NOTE ADULT - SUBJECTIVE AND OBJECTIVE BOX
Abdiel Palma, PGY1  Internal Medicine      PATIENT: JESSICA WEST, MRN: [id number]    CHIEF COMPLAINT: Patient is a 41y old  Female who presents with a chief complaint of LOC (18 Oct 2021 07:13)      INTERVAL HISTORY/OVERNIGHT EVENTS:   No overnight events. At bedside, patient has been sleeping and eating well. Denies N/V/D/C. Last BM x1 regular yesterday. Denies abdominal pain. Denies chest pain or SOB, cough. Has been ambulating with assistance. Oriented to person, place, and time. Breathing comfortably on room air.    REVIEW OF SYSTEMS:  Negative unless noted in HPI      MEDICATIONS:  MEDICATIONS  (STANDING):  dextrose 40% Gel 15 Gram(s) Oral once  dextrose 5%. 1000 milliLiter(s) (50 mL/Hr) IV Continuous <Continuous>  dextrose 5%. 1000 milliLiter(s) (100 mL/Hr) IV Continuous <Continuous>  dextrose 50% Injectable 25 Gram(s) IV Push once  dextrose 50% Injectable 12.5 Gram(s) IV Push once  dextrose 50% Injectable 25 Gram(s) IV Push once  enoxaparin Injectable 70 milliGRAM(s) SubCutaneous every 12 hours  glucagon  Injectable 1 milliGRAM(s) IntraMuscular once    MEDICATIONS  (PRN):  acetaminophen   Tablet .. 650 milliGRAM(s) Oral every 6 hours PRN Temp greater or equal to 38C (100.4F), Mild Pain (1 - 3)  ondansetron Injectable 4 milliGRAM(s) IV Push every 8 hours PRN Nausea and/or Vomiting      ALLERGIES: Allergies    penicillin (Rash)    Intolerances        OBJECTIVE:    VITALS:   Vital Signs Last 24 Hrs  T(C): 37 (19 Oct 2021 01:00), Max: 37.3 (18 Oct 2021 21:00)  T(F): 98.6 (19 Oct 2021 01:00), Max: 99.2 (18 Oct 2021 21:00)  HR: 88 (19 Oct 2021 01:00) (71 - 112)  BP: 109/70 (19 Oct 2021 01:00) (104/61 - 127/59)  BP(mean): --  RR: 18 (19 Oct 2021 01:00) (16 - 18)  SpO2: 99% (19 Oct 2021 01:00) (99% - 100%)    CAPILLARY BLOOD GLUCOSE      POCT Blood Glucose.: 85 mg/dL (19 Oct 2021 06:49)  POCT Blood Glucose.: 93 mg/dL (18 Oct 2021 23:41)  POCT Blood Glucose.: 83 mg/dL (18 Oct 2021 18:31)  POCT Blood Glucose.: 103 mg/dL (18 Oct 2021 16:30)      I&O's Summary    Daily Height in cm: 156.21 (18 Oct 2021 17:15)    Daily     PHYSICAL EXAMINATION:  General: Comfortable, no acute distress, cooperative with exam.  HEENT: PERRLA, EOMI, moist mucous membranes.  Respiratory: CTAB, normal respiratory effort, no coughing, wheezes, crackles, or rales.  CV: RRR, S1S2, no murmurs, rubs or gallops. No JVD. Distal pulses intact.  Abdominal: Soft, nontender, nondistended, no rebound or guarding, normal bowel sounds.  Neurology: AOx3, no focal neuro defects, IBANEZ x 4.  Extremities: No pitting edema, + Peripheral pulses.      LABS:                        10.2   7.00  )-----------( 253      ( 18 Oct 2021 05:16 )             30.9     10-18    135  |  105  |  10  ----------------------------<  84  4.2   |  19<L>  |  0.68    Ca    8.9      18 Oct 2021 05:16  Phos  3.6     10-18  Mg     2.00     10-18    TPro  6.9  /  Alb  4.0  /  TBili  <0.2  /  DBili  x   /  AST  24  /  ALT  33  /  AlkPhos  68  10-17    PT/INR - ( 17 Oct 2021 19:20 )   PT: 10.2 sec;   INR: 0.89 ratio         PTT - ( 17 Oct 2021 19:20 )  PTT:33.4 sec      Urinalysis Basic - ( 17 Oct 2021 22:12 )    Color: Colorless / Appearance: Clear / S.009 / pH: x  Gluc: x / Ketone: Trace  / Bili: Negative / Urobili: <2 mg/dL   Blood: x / Protein: Negative / Nitrite: Negative   Leuk Esterase: Small / RBC: 1 /HPF / WBC 2 /HPF   Sq Epi: x / Non Sq Epi: 3 /HPF / Bacteria: Negative          MICRO:  Microbiology     EKG:    RADIOLOGY & ADDITIONAL TESTS:    IMAGING:  Abdiel Palma, PGY1  Internal Medicine      PATIENT: JESSICA WEST, MRN: [id number]    CHIEF COMPLAINT: Patient is a 41y old  Female who presents with a chief complaint of LOC (18 Oct 2021 07:13)      INTERVAL HISTORY/OVERNIGHT EVENTS:   - pt denies further episodes of dizziness/burry vision  - fingersticks in 80s-90s although pt has been eating almost full trays of food  - continues to feel fetal movement, denies vaginal bleeding, abdominal pain    REVIEW OF SYSTEMS:  Negative unless noted in HPI      MEDICATIONS:  MEDICATIONS  (STANDING):  dextrose 40% Gel 15 Gram(s) Oral once  dextrose 5%. 1000 milliLiter(s) (50 mL/Hr) IV Continuous <Continuous>  dextrose 5%. 1000 milliLiter(s) (100 mL/Hr) IV Continuous <Continuous>  dextrose 50% Injectable 25 Gram(s) IV Push once  dextrose 50% Injectable 12.5 Gram(s) IV Push once  dextrose 50% Injectable 25 Gram(s) IV Push once  enoxaparin Injectable 70 milliGRAM(s) SubCutaneous every 12 hours  glucagon  Injectable 1 milliGRAM(s) IntraMuscular once    MEDICATIONS  (PRN):  acetaminophen   Tablet .. 650 milliGRAM(s) Oral every 6 hours PRN Temp greater or equal to 38C (100.4F), Mild Pain (1 - 3)  ondansetron Injectable 4 milliGRAM(s) IV Push every 8 hours PRN Nausea and/or Vomiting      ALLERGIES: Allergies    penicillin (Rash)    Intolerances        OBJECTIVE:    VITALS:   Vital Signs Last 24 Hrs  T(C): 37 (19 Oct 2021 01:00), Max: 37.3 (18 Oct 2021 21:00)  T(F): 98.6 (19 Oct 2021 01:00), Max: 99.2 (18 Oct 2021 21:00)  HR: 88 (19 Oct 2021 01:00) (71 - 112)  BP: 109/70 (19 Oct 2021 01:00) (104/61 - 127/59)  BP(mean): --  RR: 18 (19 Oct 2021 01:00) (16 - 18)  SpO2: 99% (19 Oct 2021 01:00) (99% - 100%)    CAPILLARY BLOOD GLUCOSE      POCT Blood Glucose.: 85 mg/dL (19 Oct 2021 06:49)  POCT Blood Glucose.: 93 mg/dL (18 Oct 2021 23:41)  POCT Blood Glucose.: 83 mg/dL (18 Oct 2021 18:31)  POCT Blood Glucose.: 103 mg/dL (18 Oct 2021 16:30)      I&O's Summary    Daily Height in cm: 156.21 (18 Oct 2021 17:15)    Daily     PHYSICAL EXAMINATION:  General: Comfortable, no acute distress, cooperative with exam.  HEENT: PERRLA, EOMI, moist mucous membranes.  Respiratory: CTAB, normal respiratory effort, no coughing, wheezes, crackles, or rales.  CV: RRR, S1S2, no murmurs, rubs or gallops. No JVD.   Abdominal: Gravid. Soft, nontender, nondistended, no rebound or guarding, normal bowel sounds.  Neurology: AOx3, no focal neuro defects, IBANEZ x 4.  Extremities: No pitting edema, + Peripheral pulses. Warm, good capillary refill      LABS:                        10.2   7.00  )-----------( 253      ( 18 Oct 2021 05:16 )             30.9     10-18    135  |  105  |  10  ----------------------------<  84  4.2   |  19<L>  |  0.68    Ca    8.9      18 Oct 2021 05:16  Phos  3.6     10-18  Mg     2.00     10-18    TPro  6.9  /  Alb  4.0  /  TBili  <0.2  /  DBili  x   /  AST  24  /  ALT  33  /  AlkPhos  68  10-17    PT/INR - ( 17 Oct 2021 19:20 )   PT: 10.2 sec;   INR: 0.89 ratio         PTT - ( 17 Oct 2021 19:20 )  PTT:33.4 sec      Urinalysis Basic - ( 17 Oct 2021 22:12 )    Color: Colorless / Appearance: Clear / S.009 / pH: x  Gluc: x / Ketone: Trace  / Bili: Negative / Urobili: <2 mg/dL   Blood: x / Protein: Negative / Nitrite: Negative   Leuk Esterase: Small / RBC: 1 /HPF / WBC 2 /HPF   Sq Epi: x / Non Sq Epi: 3 /HPF / Bacteria: Negative          MICRO:  Microbiology     EKG:    RADIOLOGY & ADDITIONAL TESTS:    IMAGING:  Abdiel Palma, PGY1  Internal Medicine      PATIENT: JESSICA WEST, MRN: [id number]    CHIEF COMPLAINT: Patient is a 41y old  Female who presents with a chief complaint of LOC (18 Oct 2021 07:13)      INTERVAL HISTORY/OVERNIGHT EVENTS:   - Sinus tachy to 130sc overnight on tele, NSR otherwise  - pt denies further episodes of dizziness/burry vision  - fingersticks in 80s-90s although pt has been eating almost full trays of food  - continues to feel fetal movement, denies vaginal bleeding, abdominal pain    REVIEW OF SYSTEMS:  Negative unless noted in HPI      MEDICATIONS:  MEDICATIONS  (STANDING):  dextrose 40% Gel 15 Gram(s) Oral once  dextrose 5%. 1000 milliLiter(s) (50 mL/Hr) IV Continuous <Continuous>  dextrose 5%. 1000 milliLiter(s) (100 mL/Hr) IV Continuous <Continuous>  dextrose 50% Injectable 25 Gram(s) IV Push once  dextrose 50% Injectable 12.5 Gram(s) IV Push once  dextrose 50% Injectable 25 Gram(s) IV Push once  enoxaparin Injectable 70 milliGRAM(s) SubCutaneous every 12 hours  glucagon  Injectable 1 milliGRAM(s) IntraMuscular once    MEDICATIONS  (PRN):  acetaminophen   Tablet .. 650 milliGRAM(s) Oral every 6 hours PRN Temp greater or equal to 38C (100.4F), Mild Pain (1 - 3)  ondansetron Injectable 4 milliGRAM(s) IV Push every 8 hours PRN Nausea and/or Vomiting      ALLERGIES: Allergies    penicillin (Rash)    Intolerances        OBJECTIVE:    VITALS:   Vital Signs Last 24 Hrs  T(C): 37 (19 Oct 2021 01:00), Max: 37.3 (18 Oct 2021 21:00)  T(F): 98.6 (19 Oct 2021 01:00), Max: 99.2 (18 Oct 2021 21:00)  HR: 88 (19 Oct 2021 01:00) (71 - 112)  BP: 109/70 (19 Oct 2021 01:00) (104/61 - 127/59)  BP(mean): --  RR: 18 (19 Oct 2021 01:00) (16 - 18)  SpO2: 99% (19 Oct 2021 01:00) (99% - 100%)    CAPILLARY BLOOD GLUCOSE      POCT Blood Glucose.: 85 mg/dL (19 Oct 2021 06:49)  POCT Blood Glucose.: 93 mg/dL (18 Oct 2021 23:41)  POCT Blood Glucose.: 83 mg/dL (18 Oct 2021 18:31)  POCT Blood Glucose.: 103 mg/dL (18 Oct 2021 16:30)      I&O's Summary    Daily Height in cm: 156.21 (18 Oct 2021 17:15)    Daily     PHYSICAL EXAMINATION:  General: Comfortable, no acute distress, cooperative with exam.  HEENT: PERRLA, EOMI, moist mucous membranes.  Respiratory: CTAB, normal respiratory effort, no coughing, wheezes, crackles, or rales.  CV: RRR, S1S2, no murmurs, rubs or gallops. No JVD.   Abdominal: Gravid. Soft, nontender, nondistended, no rebound or guarding, normal bowel sounds.  Neurology: AOx3, no focal neuro defects, IBANEZ x 4.  Extremities: No pitting edema, + Peripheral pulses. Warm, good capillary refill      LABS:                        10.2   7.00  )-----------( 253      ( 18 Oct 2021 05:16 )             30.9     10-18    135  |  105  |  10  ----------------------------<  84  4.2   |  19<L>  |  0.68    Ca    8.9      18 Oct 2021 05:16  Phos  3.6     10-18  Mg     2.00     10-18    TPro  6.9  /  Alb  4.0  /  TBili  <0.2  /  DBili  x   /  AST  24  /  ALT  33  /  AlkPhos  68  10-17    PT/INR - ( 17 Oct 2021 19:20 )   PT: 10.2 sec;   INR: 0.89 ratio         PTT - ( 17 Oct 2021 19:20 )  PTT:33.4 sec      Urinalysis Basic - ( 17 Oct 2021 22:12 )    Color: Colorless / Appearance: Clear / S.009 / pH: x  Gluc: x / Ketone: Trace  / Bili: Negative / Urobili: <2 mg/dL   Blood: x / Protein: Negative / Nitrite: Negative   Leuk Esterase: Small / RBC: 1 /HPF / WBC 2 /HPF   Sq Epi: x / Non Sq Epi: 3 /HPF / Bacteria: Negative          MICRO:  Microbiology     EKG:    RADIOLOGY & ADDITIONAL TESTS:    IMAGING:

## 2021-10-19 NOTE — EEG REPORT - NS EEG TEXT BOX
[facility]   COMPREHENSIVE EPILEPSY CENTER   REPORT OF ROUTINE VIDEO EEG     [facility]: [address], [place] [address], Ph#: [phone]  [place]: [address], [place] [address], Ph#: [phone]  [facility]: [address] [place] [address], Ph#: [phone]    Patient Name: JESSICA WEST  Age and : 41y ([birth year])  MRN #: [id number]  Location: [facility] 4S [address]  Referring Physician: Dara Peterson    Study Date: 10-19-21    _____________________________________________________________  TECHNICAL INFORMATION    Placement and Labeling of Electrodes:  The EEG was performed utilizing 20 channels referential EEG connections (coronal over temporal over parasagittal montage) using all standard 10-20 electrode placements with EKG.  Recording was at a sampling rate of 256 samples per second per channel.  Time synchronized digital video recording was done simultaneously with EEG recording.  A low light infrared camera was used for low light recording.  Alan and seizure detection algorithms were utilized.    _____________________________________________________________  HISTORY    Patient is a 41y old  Female who presents with a chief complaint of Syncope (19 Oct 2021 07:13)      PERTINENT MEDICATION:  none  _____________________________________________________________  STUDY INTERPRETATION    Findings: The background was continuous, spontaneously variable and reactive. During wakefulness, the posterior dominant rhythm consisted of symmetric, well-modulated 9-10 Hz activity, with amplitude to 30 uV, that attenuated to eye opening.  Low amplitude frontal beta was noted in wakefulness.    Background Slowing:  No generalized background slowing was present.    Focal Slowing:   None were present.    Sleep Background:  Drowsiness was characterized by fragmentation, attenuation, and slowing of the background activity.    Stage II sleep transients were not recorded.    Other Non-Epileptiform Findings:  None were present.    Interictal Epileptiform Activity:   None were present.    Events:  Clinical events: None recorded.  Seizures: None recorded.    Activation Procedures:   Hyperventilation was not performed.  Photic stimulation was performed and did not elicit any abnormality.     Artifacts:  Intermittent myogenic and movement artifacts were noted.    ECG:  The heart rate on single channel ECG was predominantly between 80-90 BPM.    _____________________________________________________________  EEG SUMMARY/CLASSIFICATION    Normal EEG in the awake, drowsy states.  _____________________________________________________________  EEG IMPRESSION/CLINICAL CORRELATE    Normal EEG study.  No epileptiform pattern or seizure seen.    Quintin Cobb MD PGY-5  Epilepsy Fellow    This Preliminary report is based on fellow review. Final report pending attending review.    Reading Room: [phone]  On Call Service After Hours: [phone] [facility]   COMPREHENSIVE EPILEPSY CENTER   REPORT OF ROUTINE VIDEO EEG     [facility]: [address], [place] [address], Ph#: [phone]  [place]: [address], [place] [address], Ph#: [phone]  [facility]: [address] [place] [address], Ph#: [phone]    Patient Name: JESSICA WEST  Age and : 41y (78)  MRN #: [id number]  Location: [facility] [address]S [address]  Referring Physician: Dara Peterson    Study Date: 10-19-21    _____________________________________________________________  TECHNICAL INFORMATION    Placement and Labeling of Electrodes:  The EEG was performed utilizing 20 channels referential EEG connections (coronal over temporal over parasagittal montage) using all standard 10-20 electrode placements with EKG.  Recording was at a sampling rate of 256 samples per second per channel.  Time synchronized digital video recording was done simultaneously with EEG recording.  A low light infrared camera was used for low light recording.  Alan and seizure detection algorithms were utilized.    _____________________________________________________________  HISTORY    Patient is a 41y old  Female who presents with a chief complaint of Syncope (19 Oct 2021 07:13)      PERTINENT MEDICATION:  none  _____________________________________________________________  STUDY INTERPRETATION    Findings: The background was continuous, spontaneously variable and reactive. During wakefulness, the posterior dominant rhythm consisted of symmetric, well-modulated 9-10 Hz activity, with amplitude to 30 uV, that attenuated to eye opening.  Low amplitude frontal beta was noted in wakefulness.    Background Slowing:  No generalized background slowing was present.    Focal Slowing:   None were present.    Sleep Background:  Drowsiness was characterized by fragmentation, attenuation, and slowing of the background activity.    Stage II sleep transients were not recorded.    Other Non-Epileptiform Findings:  None were present.    Interictal Epileptiform Activity:   None were present.    Events:  Clinical events: None recorded.  Seizures: None recorded.    Activation Procedures:   Hyperventilation was not performed.  Photic stimulation was performed and did not elicit any abnormality.     Artifacts:  Intermittent myogenic and movement artifacts were noted.    ECG:  The heart rate on single channel ECG was predominantly between 80-90 BPM.    _____________________________________________________________  EEG SUMMARY/CLASSIFICATION    Normal EEG in the awake, drowsy states.  _____________________________________________________________  EEG IMPRESSION/CLINICAL CORRELATE    Normal EEG study.  No epileptiform pattern or seizure seen.    Qunitin Cobb MD PGY-5  Epilepsy Fellow    Timmy Friend MD  Neurology Attending Physician      Reading Room: [phone]  On Call Service After Hours: [phone]

## 2021-10-19 NOTE — CHART NOTE - NSCHARTNOTEFT_GEN_A_CORE
EEG Reviewed and Results as below:    EEG SUMMARY/CLASSIFICATION    Normal EEG in the awake, drowsy states.  _____________________________________________________________  EEG IMPRESSION/CLINICAL CORRELATE    Normal EEG study.  No epileptiform pattern or seizure seen.    No evidence of seizure at this time. Patient's presentation most consistent with convulsive syncope.  May obtain brain MRI as outpatient.    No further inpatient neurological workup.  May follow up with outpatient neurology, at [address] ([phone])

## 2021-10-19 NOTE — PROGRESS NOTE ADULT - PROBLEM SELECTOR PLAN 3
Patient reports multiple episodes of hypoglycemia associated with diaphoresis, SOB, blurry vision and palpitations. Denies prior diabetes or gestational diabetes hx.  - insulin wnl, C-peptide mildly elevated (?poor renal clearance)  - A1C 4.8    Plan:  -F/u pro-insulin  -F/u Sulfonylurea level  -Hypoglycemia protocol  -FSG q6  -endocrine consult Patient reports multiple episodes of hypoglycemia associated with diaphoresis, SOB, blurry vision and palpitations. Denies prior diabetes or gestational diabetes hx.  - insulin wnl, C-peptide mildly elevated (?poor renal clearance)  - A1C 4.8    Plan:  -F/u pro-insulin  -F/u Sulfonylurea level  -Hypoglycemia protocol  -FSG q6  -endocrine consult for hypoglycemia

## 2021-10-19 NOTE — DISCHARGE NOTE NURSING/CASE MANAGEMENT/SOCIAL WORK - PATIENT PORTAL LINK FT
You can access the FollowMyHealth Patient Portal offered by [facility] by registering at the following website: [email or web address]. By joining [facility]’s FollowMyHealth portal, you will also be able to view your health information using other applications (apps) compatible with our system.

## 2021-10-19 NOTE — DISCHARGE NOTE PROVIDER - INSTRUCTIONS
Make sure you are having frequent meals throughout the day to ensure your blood sugar level doesn't get too low. If you start feeling dizzy, try drinking some juice to bring your blood sugar level up.

## 2021-10-19 NOTE — PROGRESS NOTE ADULT - PROBLEM SELECTOR PLAN 4
At [facility], CTA positive for R subsegmental PE. Started on Lovenox 70 mg BID.    - c/w Lovenox 70mg BID here Sinus tachy to 130s, in pt dx w PE last week but none seen on CTAP this admission    - likely 2/2 physiologic changes in pregnancy  - cont to monitor on tele

## 2021-10-19 NOTE — CONSULT NOTE ADULT - ASSESSMENT
Patient is a 41 F at 22 weeks pregnant with PMHx of recently diagnosed PE x 1 week ago (on lovenox) presents to [facility] ED following a witnessed episode of syncope. Endocrine consulted for concern for hypoglycemia    #Syncopal episode. Very low suspicion for hypoglycemia given no hx of hypoglycemia and no documented hypoglycemia inpatient (hypoglycemia in non diabetic pt is defined by whipple's triad: serum glucose <55 associated with hypoglycemic symptoms which improve after ingestion of glucose). Further, symptoms not related to food and not improved with eating per history   Symptoms possibly related to PE given recent hx versus other etiologies   - Recommend to investigate into other etiologies of symptoms/syncopal episode   - Pt does not need to remain inpatient from Endocrine perspective  - Recommend adequate nutrition/PO intake     Endocrine to sign off.  Reconsult as needed

## 2021-10-19 NOTE — PROGRESS NOTE ADULT - PROBLEM SELECTOR PLAN 5
- Evaluated by OB-GYN in ED  - Per OB, +FHR, gross fetal movement noted on US  - No acute OB interventions indicated at this time  - MFM consult in AM - Evaluated by OB-GYN in ED  - Per OB, +FHR, gross fetal movement noted on US  - No acute OB interventions indicated at this time At [facility], CTA positive for R subsegmental PE. Started on Lovenox 70 mg BID.    - c/w Lovenox 70mg BID here

## 2021-10-19 NOTE — DISCHARGE NOTE PROVIDER - NSDCCPCAREPLAN_GEN_ALL_CORE_FT
PRINCIPAL DISCHARGE DIAGNOSIS  Diagnosis: Syncope  Assessment and Plan of Treatment: You came to the hospital because you were having episodes of fainting. You were seen by our neurology team, who did not see any seizures on EEG. They did not have any further inpatient recommendations, but want you to follow up as an outpatient and get an MRI of your brain, as your most recent dizziness episode may have been a seizure.   It is possible that low blood sugar is contributing to this. A normal blood sugar level is above 70. You can check your fingerstick glucose levels at home with the glucometer we are discharging you with. If your blood sugar drops below 80, you should drink some juice. If it drops below 50, have someone bring you to the ED- do not drive yourself in case you have another loss of consciousness episode.  If you have any more episodes of lightheadedness, blurry vision, or losing conscioousness, return to the ED.  You can continue taking the lovenox twice a day at your home dose. You do not have any new meds.  Follow up with your obstetrician.      SECONDARY DISCHARGE DIAGNOSES  Diagnosis: Witnessed seizure-like activity  Assessment and Plan of Treatment:     Diagnosis: 22 weeks gestation of pregnancy  Assessment and Plan of Treatment:     Diagnosis: Pulmonary embolism  Assessment and Plan of Treatment: 10/2021

## 2021-11-02 LAB — SULFONYLUREA SERPL-MCNC: SIGNIFICANT CHANGE UP

## 2022-07-09 NOTE — ED ADULT TRIAGE NOTE - NS ED TRIAGE AVPU SCALE
Alert-The patient is alert, awake and responds to voice. The patient is oriented to time, place, and person. The triage nurse is able to obtain subjective information.
declines

## 2022-09-26 NOTE — PATIENT PROFILE OB - PRO PARENT CONCERN YN OB
Please call patient to advise he will need to coordinate with the cardiology department at [facility] [phone] no

## 2022-10-28 NOTE — PATIENT PROFILE OB - NS PRO CONTRA FLU 1
Patient Aox4, RA, VSS, bedrest this shift. No complaints of N/V/D. Patient complaining of generalized pain during morning medications- PRN Dilaudid given. PT/OT worked with patient and patient stated increased pain. MD Arias made aware of pain level and PRN medication not being due. Per MD Arias, okay to give a second dose early.   L AC flushing well and saline locked.   Fair appetite, voiding well.  All due medications given per MAR. No acute changes this shift. Will notify MD of any changes.     1830: Patient complaining of pain but not due until 8PM. MD Hunt made aware and referred this RN to CARLO Swartz. Sheridan notified this RN of pain recommendations but no orders were placed and RN was referred to Gilbert. Gilbert notified and pain regimen changed to PRN Norco 10mg c4hyhyn with discontinuation of Tylenol 1000mg and Prn Dilaudid. 1 dose of PRN Norco given.   yes

## 2024-12-03 NOTE — PATIENT PROFILE ADULT - TOBACCO USE
No care due was identified.  [facility] Embedded Care Due Messages. Reference number: [id number].   12/03/2024 11:32:19 AM CST   Never smoker
